# Patient Record
Sex: MALE | Race: WHITE | NOT HISPANIC OR LATINO | Employment: FULL TIME | URBAN - METROPOLITAN AREA
[De-identification: names, ages, dates, MRNs, and addresses within clinical notes are randomized per-mention and may not be internally consistent; named-entity substitution may affect disease eponyms.]

---

## 2018-05-18 DIAGNOSIS — E29.1 TESTOSTERONE DEFICIENCY IN MALE: Primary | ICD-10-CM

## 2018-05-18 PROBLEM — E55.9 VITAMIN D DEFICIENCY: Status: ACTIVE | Noted: 2018-05-18

## 2018-05-18 RX ORDER — TESTOSTERONE CYPIONATE 1000 MG/10ML
50 INJECTION, SOLUTION INTRAMUSCULAR WEEKLY
Qty: 10 ML | Refills: 0 | Status: SHIPPED | OUTPATIENT
Start: 2018-05-18 | End: 2018-06-15 | Stop reason: RX

## 2018-05-18 RX ORDER — TESTOSTERONE CYPIONATE 1000 MG/10ML
0.5 INJECTION, SOLUTION INTRAMUSCULAR WEEKLY
COMMUNITY
End: 2018-05-18 | Stop reason: SDUPTHER

## 2018-05-18 RX ORDER — CHOLECALCIFEROL (VITAMIN D3) 25 MCG
1 TABLET ORAL DAILY
COMMUNITY

## 2018-06-15 DIAGNOSIS — E29.1 TESTOSTERONE DEFICIENCY IN MALE: Primary | ICD-10-CM

## 2018-06-15 RX ORDER — TESTOSTERONE CYPIONATE 200 MG/ML
50 INJECTION, SOLUTION INTRAMUSCULAR
Qty: 10 ML | Refills: 0 | Status: SHIPPED | OUTPATIENT
Start: 2018-06-15 | End: 2018-10-30

## 2018-10-22 ENCOUNTER — TELEPHONE (OUTPATIENT)
Dept: INTERNAL MEDICINE | Facility: CLINIC | Age: 48
End: 2018-10-22

## 2018-10-22 ENCOUNTER — CLINICAL SUPPORT (OUTPATIENT)
Dept: INTERNAL MEDICINE | Facility: CLINIC | Age: 48
End: 2018-10-22
Payer: COMMERCIAL

## 2018-10-22 DIAGNOSIS — Z00.00 ROUTINE GENERAL MEDICAL EXAMINATION AT A HEALTH CARE FACILITY: Primary | ICD-10-CM

## 2018-10-22 PROCEDURE — 92551 PURE TONE HEARING TEST AIR: CPT | Mod: ,,, | Performed by: NURSE PRACTITIONER

## 2018-10-22 PROCEDURE — 90682 RIV4 VACC RECOMBINANT DNA IM: CPT | Mod: S$GLB,,, | Performed by: NURSE PRACTITIONER

## 2018-10-22 PROCEDURE — 90471 IMMUNIZATION ADMIN: CPT | Mod: S$GLB,,, | Performed by: NURSE PRACTITIONER

## 2018-10-23 ENCOUNTER — TELEPHONE (OUTPATIENT)
Dept: SURGERY | Facility: CLINIC | Age: 48
End: 2018-10-23

## 2018-10-23 NOTE — TELEPHONE ENCOUNTER
Phoned pt. Explained to pt that he would need to see his PCP for his yearly physical but he could be seen if he is due for his screening colonoscopy.  Pt states that he is leaving to go out of the country and will give the office a call back when he gets back.

## 2018-10-23 NOTE — TELEPHONE ENCOUNTER
----- Message from Sienna Link sent at 10/23/2018 10:40 AM CDT -----  Contact: Self  Patient states he comes to see Dr for his yearly physical and needs to get in next week   No appts available next week     Please call [phone] (home)

## 2018-10-30 ENCOUNTER — OFFICE VISIT (OUTPATIENT)
Dept: SURGERY | Facility: CLINIC | Age: 48
End: 2018-10-30
Payer: COMMERCIAL

## 2018-10-30 VITALS
HEIGHT: 70 IN | WEIGHT: 183.88 LBS | HEART RATE: 72 BPM | DIASTOLIC BLOOD PRESSURE: 78 MMHG | BODY MASS INDEX: 26.33 KG/M2 | RESPIRATION RATE: 18 BRPM | SYSTOLIC BLOOD PRESSURE: 124 MMHG | TEMPERATURE: 97 F | OXYGEN SATURATION: 97 %

## 2018-10-30 DIAGNOSIS — E29.1 TESTOSTERONE DEFICIENCY IN MALE: ICD-10-CM

## 2018-10-30 DIAGNOSIS — Z00.00 WELLNESS EXAMINATION: ICD-10-CM

## 2018-10-30 DIAGNOSIS — E55.9 VITAMIN D DEFICIENCY: ICD-10-CM

## 2018-10-30 DIAGNOSIS — Z12.11 ENCOUNTER FOR SCREENING COLONOSCOPY: Primary | ICD-10-CM

## 2018-10-30 PROCEDURE — 3008F BODY MASS INDEX DOCD: CPT | Mod: S$GLB,,, | Performed by: SURGERY

## 2018-10-30 PROCEDURE — 99203 OFFICE O/P NEW LOW 30 MIN: CPT | Mod: S$GLB,,, | Performed by: SURGERY

## 2018-10-30 RX ORDER — LIDOCAINE HYDROCHLORIDE 10 MG/ML
1 INJECTION, SOLUTION EPIDURAL; INFILTRATION; INTRACAUDAL; PERINEURAL ONCE
Status: CANCELLED | OUTPATIENT
Start: 2018-10-30 | End: 2018-10-30

## 2018-10-30 RX ORDER — TESTOSTERONE CYPIONATE 200 MG/ML
50 INJECTION, SOLUTION INTRAMUSCULAR
Qty: 10 ML | Refills: 0 | Status: SHIPPED | OUTPATIENT
Start: 2018-10-30 | End: 2019-04-08 | Stop reason: SDUPTHER

## 2018-10-30 RX ORDER — SODIUM CHLORIDE, SODIUM LACTATE, POTASSIUM CHLORIDE, CALCIUM CHLORIDE 600; 310; 30; 20 MG/100ML; MG/100ML; MG/100ML; MG/100ML
INJECTION, SOLUTION INTRAVENOUS CONTINUOUS
Status: CANCELLED | OUTPATIENT
Start: 2018-10-30

## 2018-10-30 RX ORDER — SODIUM, POTASSIUM,MAG SULFATES 17.5-3.13G
SOLUTION, RECONSTITUTED, ORAL ORAL
Qty: 2 BOTTLE | Refills: 0 | Status: ON HOLD | OUTPATIENT
Start: 2018-10-30 | End: 2018-12-03 | Stop reason: HOSPADM

## 2018-10-30 NOTE — PROGRESS NOTES
Subjective:       Patient ID: Gustabo Iqbal is a 48 y.o. male.    Chief Complaint: Follow-up      HPI:  Mr. Iqbal is seen today as a self-referral for colon cancer screening and wellness evaluation. He has no complaints. No abdominal pain, nausea, vomiting, diarrhea, constipation, melena, hematochezia, change in bowel habits, change in stool characteristics, weight loss, decrease in caliber of stool, etc. No family history of colon cancer. No aggravating or alleviating factors. No other associated symptoms. No prior colonoscopy.        Allergies & Meds:  Review of patient's allergies indicates:  No Known Allergies    Current Outpatient Medications   Medication Sig Dispense Refill    vitamin D 1000 units Tab Take 1 tablet by mouth once daily.      sodium,potassium,mag sulfates (SUPREP BOWEL PREP KIT) 17.5-3.13-1.6 gram SolR Follow written instructions provided by Clinic 2 Bottle 0    testosterone cypionate (DEPOTESTOTERONE CYPIONATE) 200 mg/mL injection Inject 0.25 mLs (50 mg total) into the muscle every 7 days. 10 mL 0     No current facility-administered medications for this visit.        PMFSHx:  Past Medical History:   Diagnosis Date    Annita-Parkinson-White syndrome        Past Surgical History:   Procedure Laterality Date    CARDIAC ELECTROPHYSIOLOGY STUDY AND ABLATION         Family History   Family history unknown: Yes       Social History     Tobacco Use    Smoking status: Never Smoker    Smokeless tobacco: Never Used   Substance Use Topics    Alcohol use: No     Frequency: Never    Drug use: No       Review of Systems   Constitutional: Negative for appetite change, chills, fatigue, fever and unexpected weight change.   HENT: Negative for congestion, dental problem, ear pain, mouth sores, postnasal drip, rhinorrhea, sore throat, tinnitus, trouble swallowing and voice change.    Eyes: Negative for photophobia, pain, discharge and visual disturbance.   Respiratory: Negative for cough, chest  tightness, shortness of breath and wheezing.    Cardiovascular: Negative for chest pain, palpitations and leg swelling.   Gastrointestinal: Negative for abdominal pain, blood in stool, constipation, diarrhea, nausea and vomiting.   Endocrine: Negative for cold intolerance, heat intolerance, polydipsia, polyphagia and polyuria.   Genitourinary: Negative for difficulty urinating, dysuria, flank pain, frequency, hematuria and urgency.   Musculoskeletal: Negative for arthralgias, joint swelling and myalgias.   Skin: Negative for color change and rash.   Allergic/Immunologic: Negative for immunocompromised state.   Neurological: Negative for dizziness, tremors, seizures, syncope, speech difficulty, weakness, numbness and headaches.   Hematological: Negative for adenopathy. Does not bruise/bleed easily.   Psychiatric/Behavioral: Negative for agitation, confusion, hallucinations, self-injury and suicidal ideas. The patient is not nervous/anxious.        Objective:      Physical Exam   Constitutional: He is oriented to person, place, and time. He appears well-developed and well-nourished. He is active.  Non-toxic appearance. He does not appear ill. No distress.   HENT:   Head: Normocephalic and atraumatic. Head is without contusion.   Right Ear: Hearing and external ear normal. No drainage or tenderness.   Left Ear: Hearing and external ear normal. No drainage or tenderness.   Nose: Nose normal. No rhinorrhea.   Eyes: Conjunctivae and lids are normal. Pupils are equal, round, and reactive to light. Right eye exhibits no discharge and no exudate. Left eye exhibits no discharge and no exudate. Right conjunctiva is not injected. Left conjunctiva is not injected.   Neck: Trachea normal, normal range of motion and phonation normal. No JVD present. Carotid bruit is not present. No tracheal deviation and no edema present. No thyroid mass and no thyromegaly present.   Cardiovascular: Normal rate, regular rhythm, S1 normal, S2  normal, normal heart sounds and intact distal pulses. PMI is not displaced. Exam reveals no gallop and no friction rub.   No murmur heard.  Pulmonary/Chest: Effort normal and breath sounds normal. No accessory muscle usage. No tachypnea. No respiratory distress. He exhibits no mass, no tenderness, no crepitus and no retraction. Right breast exhibits no inverted nipple, no mass, no nipple discharge and no skin change. Left breast exhibits no inverted nipple, no mass, no nipple discharge and no skin change. Breasts are symmetrical.   Abdominal: Soft. Normal appearance and bowel sounds are normal. He exhibits no distension, no fluid wave, no abdominal bruit and no mass. There is no hepatosplenomegaly. There is no tenderness. No hernia. Hernia confirmed negative in the ventral area, confirmed negative in the right inguinal area and confirmed negative in the left inguinal area.   Genitourinary: Testes normal and penis normal. Right testis shows no mass and no swelling. Left testis shows no mass and no swelling.   Musculoskeletal:        Cervical back: Normal.        Thoracic back: Normal.        Lumbar back: Normal.        Right upper arm: Normal.        Left upper arm: Normal.        Right forearm: Normal.        Left forearm: Normal.        Right hand: Normal.        Left hand: Normal.        Right upper leg: Normal.        Left upper leg: Normal.        Right lower leg: Normal.        Left lower leg: Normal.        Right foot: Normal.        Left foot: Normal.   Lymphadenopathy:        Head (right side): No submental and no submandibular adenopathy present.        Head (left side): No submental and no submandibular adenopathy present.     He has no cervical adenopathy.     He has no axillary adenopathy. No inguinal adenopathy noted on the right or left side.        Right: No inguinal and no supraclavicular adenopathy present.        Left: No inguinal and no supraclavicular adenopathy present.   Neurological: He is  alert and oriented to person, place, and time. He has normal strength. He is not disoriented. He displays no atrophy and no tremor. No cranial nerve deficit or sensory deficit. Coordination and gait normal. GCS eye subscore is 4. GCS verbal subscore is 5. GCS motor subscore is 6.   Skin: Skin is warm, dry and intact. No lesion and no rash noted. No cyanosis. Nails show no clubbing.   Psychiatric: He has a normal mood and affect. His speech is normal and behavior is normal. Thought content normal. He is not actively hallucinating. He is attentive.         Test Results:  Pending    Assessment:       1. Encounter for screening colonoscopy    2. Wellness examination    3. Testosterone deficiency in male    4. Vitamin D deficiency        Plan:   Encounter for screening colonoscopy  -     Case Request Operating Room: COLONOSCOPY  -     Full code; Standing  -     Place in Outpatient; Standing  -     Vital signs; Standing  -     Insert peripheral IV; Standing  -     Verify beta-blocker dose taken within 24 hours if patient is prescribed beta-blocker; Standing  -     Height and weight; Standing  -     Verify discontinuation of antithrombotics; Standing  -     Verify blood consent; Standing  -     Verify consent; Standing  -     Diet NPO; Standing  -     EKG 12-lead; Future; Expected date: 10/30/2018    Wellness examination  -     CBC auto differential; Future; Expected date: 10/30/2018  -     Comprehensive metabolic panel; Future; Expected date: 10/30/2018  -     TSH; Future; Expected date: 10/30/2018  -     Lipid panel; Future; Expected date: 10/30/2018  -     Hemoglobin A1c; Future; Expected date: 10/30/2018    Testosterone deficiency in male  -     Testosterone; Future; Expected date: 10/30/2018    Vitamin D deficiency  -     Vitamin D; Future; Expected date: 10/30/2018    Other orders  -     lidocaine (PF) 10 mg/ml (1%) injection 10 mg  -     lactated ringers infusion  -     IP VTE LOW RISK PATIENT; Standing  -      sodium,potassium,mag sulfates (SUPREP BOWEL PREP KIT) 17.5-3.13-1.6 gram SolR; Follow written instructions provided by Clinic  Dispense: 2 Bottle; Refill: 0  -     testosterone cypionate (DEPOTESTOTERONE CYPIONATE) 200 mg/mL injection; Inject 0.25 mLs (50 mg total) into the muscle every 7 days.  Dispense: 10 mL; Refill: 0        Follow-up in about 6 weeks (around 12/11/2018) for routine post-endosocpy visit.    Counseling/Medical Decision Making: Gustabo Iqbal was counseled regarding the results of the evaluation thus far and the differential diagnosis.  Diagnostic and therapeutic options were discussed in detail along with the risks, benefits, possible complications, details of, and indications for each option.  Diagnoses discussed included but were not limited to: hemorrhoids, diverticulosis, cancer, IBD, IBS, benign tumors, angiodysplasias.  Options discussed included but were not limited to: colonoscopy, proctoscopy, anoscopy, sigmoidoscopy, radiologic studies, PillCam, empiric therapy, second opinion, etc. Possible complications of colonoscopy discussed included but were not limited to: bleeding, infections, endocarditis, injury to internal organs, incomplete exam, failure to diagnose cancer or other serious condition, need for emergency surgery, need for a temporary colostomy, need for additional operations or procedures, etc.  Entire conversation was held in layman's terms.  Questions solicited and answered.  I read the consent form to him verbatim.  All unfamiliar terms were defined.  Krames booklets were provided on colonoscopy, polyps, diverticulosis, hemorrhoids, cancer, etc.  A copy of the consent form was provided as well for his review outside the office / hospital prior to the procedure.  At the conclusion of the conversation he voiced complete understanding of all we discussed and satisfaction that all of his questions had been answered to his full understanding.  He stated that he felt fully  informed and totally capable of making an informed decision.  He desires and requests to proceed with colonoscopy.

## 2018-11-05 ENCOUNTER — HOSPITAL ENCOUNTER (OUTPATIENT)
Dept: CARDIOLOGY | Facility: HOSPITAL | Age: 48
Discharge: HOME | End: 2018-11-05
Attending: SURGERY
Payer: COMMERCIAL

## 2018-11-05 DIAGNOSIS — Z12.11 ENCOUNTER FOR SCREENING COLONOSCOPY: ICD-10-CM

## 2018-11-05 DIAGNOSIS — Z12.11 ENCOUNTER FOR SCREENING COLONOSCOPY: Primary | ICD-10-CM

## 2018-11-05 PROCEDURE — 93005 ELECTROCARDIOGRAM TRACING: CPT

## 2018-11-11 NOTE — H&P
[facility] - General Surgery  General Surgery  H&P      Patient Name: Gustabo Iqbal  MRN: [id number]     Chief Complaint:  I am having a colonoscopy      HPI:  Mr. Iqbal is seen today as a self-referral for colon cancer screening and wellness evaluation. He has no complaints. No abdominal pain, nausea, vomiting, diarrhea, constipation, melena, hematochezia, change in bowel habits, change in stool characteristics, weight loss, decrease in caliber of stool, etc. No family history of colon cancer. No aggravating or alleviating factors. No other associated symptoms. No prior colonoscopy.        Allergies & Meds:  Review of patient's allergies indicates:  No Known Allergies    Current Outpatient Medications   Medication Sig Dispense Refill    vitamin D 1000 units Tab Take 1 tablet by mouth once daily.      sodium,potassium,mag sulfates (SUPREP BOWEL PREP KIT) 17.5-3.13-1.6 gram SolR Follow written instructions provided by Clinic 2 Bottle 0    testosterone cypionate (DEPOTESTOTERONE CYPIONATE) 200 mg/mL injection Inject 0.25 mLs (50 mg total) into the muscle every 7 days. 10 mL 0     No current facility-administered medications for this visit.        PMFSHx:  Past Medical History:   Diagnosis Date    Annita-Parkinson-White syndrome        Past Surgical History:   Procedure Laterality Date    CARDIAC ELECTROPHYSIOLOGY STUDY AND ABLATION         Family History   Family history unknown: Yes       Social History     Tobacco Use    Smoking status: Never Smoker    Smokeless tobacco: Never Used   Substance Use Topics    Alcohol use: No     Frequency: Never    Drug use: No       Review of Systems   Constitutional: Negative for appetite change, chills, fatigue, fever and unexpected weight change.   HENT: Negative for congestion, dental problem, ear pain, mouth sores, postnasal drip, rhinorrhea, sore throat, tinnitus, trouble swallowing and voice change.    Eyes: Negative for photophobia, pain,  discharge and visual disturbance.   Respiratory: Negative for cough, chest tightness, shortness of breath and wheezing.    Cardiovascular: Negative for chest pain, palpitations and leg swelling.   Gastrointestinal: Negative for abdominal pain, blood in stool, constipation, diarrhea, nausea and vomiting.   Endocrine: Negative for cold intolerance, heat intolerance, polydipsia, polyphagia and polyuria.   Genitourinary: Negative for difficulty urinating, dysuria, flank pain, frequency, hematuria and urgency.   Musculoskeletal: Negative for arthralgias, joint swelling and myalgias.   Skin: Negative for color change and rash.   Allergic/Immunologic: Negative for immunocompromised state.   Neurological: Negative for dizziness, tremors, seizures, syncope, speech difficulty, weakness, numbness and headaches.   Hematological: Negative for adenopathy. Does not bruise/bleed easily.   Psychiatric/Behavioral: Negative for agitation, confusion, hallucinations, self-injury and suicidal ideas. The patient is not nervous/anxious.        Objective:      Physical Exam   Constitutional: He is oriented to person, place, and time. He appears well-developed and well-nourished. He is active.  Non-toxic appearance. He does not appear ill. No distress.   HENT:   Head: Normocephalic and atraumatic. Head is without contusion.   Right Ear: Hearing and external ear normal. No drainage or tenderness.   Left Ear: Hearing and external ear normal. No drainage or tenderness.   Nose: Nose normal. No rhinorrhea.   Eyes: Conjunctivae and lids are normal. Pupils are equal, round, and reactive to light. Right eye exhibits no discharge and no exudate. Left eye exhibits no discharge and no exudate. Right conjunctiva is not injected. Left conjunctiva is not injected.   Neck: Trachea normal, normal range of motion and phonation normal. No JVD present. Carotid bruit is not present. No tracheal deviation and no edema present. No thyroid mass and no thyromegaly  present.   Cardiovascular: Normal rate, regular rhythm, S1 normal, S2 normal, normal heart sounds and intact distal pulses. PMI is not displaced. Exam reveals no gallop and no friction rub.   No murmur heard.  Pulmonary/Chest: Effort normal and breath sounds normal. No accessory muscle usage. No tachypnea. No respiratory distress. He exhibits no mass, no tenderness, no crepitus and no retraction. Right breast exhibits no inverted nipple, no mass, no nipple discharge and no skin change. Left breast exhibits no inverted nipple, no mass, no nipple discharge and no skin change. Breasts are symmetrical.   Abdominal: Soft. Normal appearance and bowel sounds are normal. He exhibits no distension, no fluid wave, no abdominal bruit and no mass. There is no hepatosplenomegaly. There is no tenderness. No hernia. Hernia confirmed negative in the ventral area, confirmed negative in the right inguinal area and confirmed negative in the left inguinal area.   Genitourinary: Testes normal and penis normal. Right testis shows no mass and no swelling. Left testis shows no mass and no swelling.   Musculoskeletal:        Cervical back: Normal.        Thoracic back: Normal.        Lumbar back: Normal.        Right upper arm: Normal.        Left upper arm: Normal.        Right forearm: Normal.        Left forearm: Normal.        Right hand: Normal.        Left hand: Normal.        Right upper leg: Normal.        Left upper leg: Normal.        Right lower leg: Normal.        Left lower leg: Normal.        Right foot: Normal.        Left foot: Normal.   Lymphadenopathy:        Head (right side): No submental and no submandibular adenopathy present.        Head (left side): No submental and no submandibular adenopathy present.     He has no cervical adenopathy.     He has no axillary adenopathy. No inguinal adenopathy noted on the right or left side.        Right: No inguinal and no supraclavicular adenopathy present.        Left: No  inguinal and no supraclavicular adenopathy present.   Neurological: He is alert and oriented to person, place, and time. He has normal strength. He is not disoriented. He displays no atrophy and no tremor. No cranial nerve deficit or sensory deficit. Coordination and gait normal. GCS eye subscore is 4. GCS verbal subscore is 5. GCS motor subscore is 6.   Skin: Skin is warm, dry and intact. No lesion and no rash noted. No cyanosis. Nails show no clubbing.   Psychiatric: He has a normal mood and affect. His speech is normal and behavior is normal. Thought content normal. He is not actively hallucinating. He is attentive.         Test Results:  Pending    Assessment:       1. Encounter for screening colonoscopy    2. Wellness examination    3. Testosterone deficiency in male    4. Vitamin D deficiency        Plan:   Encounter for screening colonoscopy  -     Case Request Operating Room: COLONOSCOPY  -     Full code; Standing  -     Place in Outpatient; Standing  -     Vital signs; Standing  -     Insert peripheral IV; Standing  -     Verify beta-blocker dose taken within 24 hours if patient is prescribed beta-blocker; Standing  -     Height and weight; Standing  -     Verify discontinuation of antithrombotics; Standing  -     Verify blood consent; Standing  -     Verify consent; Standing  -     Diet NPO; Standing  -     EKG 12-lead; Future; Expected date: 10/30/2018    Wellness examination  -     CBC auto differential; Future; Expected date: 10/30/2018  -     Comprehensive metabolic panel; Future; Expected date: 10/30/2018  -     TSH; Future; Expected date: 10/30/2018  -     Lipid panel; Future; Expected date: 10/30/2018  -     Hemoglobin A1c; Future; Expected date: 10/30/2018    Testosterone deficiency in male  -     Testosterone; Future; Expected date: 10/30/2018    Vitamin D deficiency  -     Vitamin D; Future; Expected date: 10/30/2018    Other orders  -     lidocaine (PF) 10 mg/ml (1%) injection 10 mg  -      lactated ringers infusion  -     IP VTE LOW RISK PATIENT; Standing  -     sodium,potassium,mag sulfates (SUPREP BOWEL PREP KIT) 17.5-3.13-1.6 gram SolR; Follow written instructions provided by Clinic  Dispense: 2 Bottle; Refill: 0  -     testosterone cypionate (DEPOTESTOTERONE CYPIONATE) 200 mg/mL injection; Inject 0.25 mLs (50 mg total) into the muscle every 7 days.  Dispense: 10 mL; Refill: 0        Follow-up in about 6 weeks (around 12/11/2018) for routine post-endosocpy visit.    Counseling/Medical Decision Making: Gustabo Iqbal was counseled regarding the results of the evaluation thus far and the differential diagnosis.  Diagnostic and therapeutic options were discussed in detail along with the risks, benefits, possible complications, details of, and indications for each option.  Diagnoses discussed included but were not limited to: hemorrhoids, diverticulosis, cancer, IBD, IBS, benign tumors, angiodysplasias.  Options discussed included but were not limited to: colonoscopy, proctoscopy, anoscopy, sigmoidoscopy, radiologic studies, PillCam, empiric therapy, second opinion, etc. Possible complications of colonoscopy discussed included but were not limited to: bleeding, infections, endocarditis, injury to internal organs, incomplete exam, failure to diagnose cancer or other serious condition, need for emergency surgery, need for a temporary colostomy, need for additional operations or procedures, etc.  Entire conversation was held in layman's terms.  Questions solicited and answered.  I read the consent form to him verbatim.  All unfamiliar terms were defined.  Krames booklets were provided on colonoscopy, polyps, diverticulosis, hemorrhoids, cancer, etc.  A copy of the consent form was provided as well for his review outside the office / hospital prior to the procedure.  At the conclusion of the conversation he voiced complete understanding of all we discussed and satisfaction that all of his questions had  been answered to his full understanding.  He stated that he felt fully informed and totally capable of making an informed decision.  He desires and requests to proceed with colonoscopy.

## 2018-12-03 ENCOUNTER — HOSPITAL ENCOUNTER (OUTPATIENT)
Facility: HOSPITAL | Age: 48
Discharge: HOME | End: 2018-12-03
Attending: SURGERY | Admitting: SURGERY
Payer: COMMERCIAL

## 2018-12-03 ENCOUNTER — ANESTHESIA (OUTPATIENT)
Dept: SURGERY | Facility: HOSPITAL | Age: 48
End: 2018-12-03
Payer: COMMERCIAL

## 2018-12-03 ENCOUNTER — ANESTHESIA EVENT (OUTPATIENT)
Dept: SURGERY | Facility: HOSPITAL | Age: 48
End: 2018-12-03
Payer: COMMERCIAL

## 2018-12-03 VITALS
WEIGHT: 185 LBS | HEART RATE: 59 BPM | DIASTOLIC BLOOD PRESSURE: 80 MMHG | HEIGHT: 70 IN | SYSTOLIC BLOOD PRESSURE: 121 MMHG | RESPIRATION RATE: 18 BRPM | TEMPERATURE: 98 F | OXYGEN SATURATION: 96 % | BODY MASS INDEX: 26.48 KG/M2

## 2018-12-03 DIAGNOSIS — Z12.11 ENCOUNTER FOR SCREENING COLONOSCOPY: ICD-10-CM

## 2018-12-03 PROCEDURE — 25000003 PHARM REV CODE 250: Performed by: SURGERY

## 2018-12-03 PROCEDURE — G0121 COLON CA SCRN NOT HI RSK IND: HCPCS | Mod: ,,, | Performed by: SURGERY

## 2018-12-03 PROCEDURE — D9220A PRA ANESTHESIA: Mod: ,,, | Performed by: ANESTHESIOLOGY

## 2018-12-03 PROCEDURE — 25000003 PHARM REV CODE 250

## 2018-12-03 PROCEDURE — 37000009 HC ANESTHESIA EA ADD 15 MINS: Performed by: SURGERY

## 2018-12-03 PROCEDURE — G0121 COLON CA SCRN NOT HI RSK IND: HCPCS | Performed by: SURGERY

## 2018-12-03 PROCEDURE — S0028 INJECTION, FAMOTIDINE, 20 MG: HCPCS

## 2018-12-03 PROCEDURE — 25000003 PHARM REV CODE 250: Performed by: NURSE ANESTHETIST, CERTIFIED REGISTERED

## 2018-12-03 PROCEDURE — 37000008 HC ANESTHESIA 1ST 15 MINUTES: Performed by: SURGERY

## 2018-12-03 PROCEDURE — 63600175 PHARM REV CODE 636 W HCPCS: Performed by: NURSE ANESTHETIST, CERTIFIED REGISTERED

## 2018-12-03 PROCEDURE — 45378 DIAGNOSTIC COLONOSCOPY: CPT | Performed by: SURGERY

## 2018-12-03 RX ORDER — SODIUM CHLORIDE, SODIUM LACTATE, POTASSIUM CHLORIDE, CALCIUM CHLORIDE 600; 310; 30; 20 MG/100ML; MG/100ML; MG/100ML; MG/100ML
INJECTION, SOLUTION INTRAVENOUS CONTINUOUS
Status: DISCONTINUED | OUTPATIENT
Start: 2018-12-03 | End: 2018-12-03 | Stop reason: HOSPADM

## 2018-12-03 RX ORDER — FAMOTIDINE 10 MG/ML
20 INJECTION INTRAVENOUS ONCE
Status: COMPLETED | OUTPATIENT
Start: 2018-12-03 | End: 2018-12-03

## 2018-12-03 RX ORDER — SODIUM CHLORIDE, SODIUM LACTATE, POTASSIUM CHLORIDE, CALCIUM CHLORIDE 600; 310; 30; 20 MG/100ML; MG/100ML; MG/100ML; MG/100ML
INJECTION, SOLUTION INTRAVENOUS CONTINUOUS PRN
Status: DISCONTINUED | OUTPATIENT
Start: 2018-12-03 | End: 2018-12-03

## 2018-12-03 RX ORDER — LIDOCAINE HYDROCHLORIDE 10 MG/ML
1 INJECTION, SOLUTION EPIDURAL; INFILTRATION; INTRACAUDAL; PERINEURAL ONCE
Status: DISCONTINUED | OUTPATIENT
Start: 2018-12-03 | End: 2018-12-03 | Stop reason: HOSPADM

## 2018-12-03 RX ORDER — FAMOTIDINE 10 MG/ML
INJECTION INTRAVENOUS
Status: COMPLETED
Start: 2018-12-03 | End: 2018-12-03

## 2018-12-03 RX ORDER — SODIUM CHLORIDE, SODIUM LACTATE, POTASSIUM CHLORIDE, CALCIUM CHLORIDE 600; 310; 30; 20 MG/100ML; MG/100ML; MG/100ML; MG/100ML
125 INJECTION, SOLUTION INTRAVENOUS CONTINUOUS
Status: DISCONTINUED | OUTPATIENT
Start: 2018-12-03 | End: 2018-12-03 | Stop reason: HOSPADM

## 2018-12-03 RX ORDER — LIDOCAINE HYDROCHLORIDE 20 MG/ML
INJECTION, SOLUTION EPIDURAL; INFILTRATION; INTRACAUDAL; PERINEURAL
Status: DISCONTINUED | OUTPATIENT
Start: 2018-12-03 | End: 2018-12-03

## 2018-12-03 RX ORDER — PROPOFOL 10 MG/ML
VIAL (ML) INTRAVENOUS
Status: DISCONTINUED | OUTPATIENT
Start: 2018-12-03 | End: 2018-12-03

## 2018-12-03 RX ORDER — ONDANSETRON 2 MG/ML
4 INJECTION INTRAMUSCULAR; INTRAVENOUS DAILY PRN
Status: DISCONTINUED | OUTPATIENT
Start: 2018-12-03 | End: 2018-12-03 | Stop reason: HOSPADM

## 2018-12-03 RX ADMIN — PROPOFOL 100 MG: 10 INJECTION, EMULSION INTRAVENOUS at 08:12

## 2018-12-03 RX ADMIN — FAMOTIDINE 20 MG: 10 INJECTION INTRAVENOUS at 07:12

## 2018-12-03 RX ADMIN — SODIUM CHLORIDE, POTASSIUM CHLORIDE, SODIUM LACTATE AND CALCIUM CHLORIDE: 600; 310; 30; 20 INJECTION, SOLUTION INTRAVENOUS at 08:12

## 2018-12-03 RX ADMIN — SODIUM CHLORIDE, POTASSIUM CHLORIDE, SODIUM LACTATE AND CALCIUM CHLORIDE: 600; 310; 30; 20 INJECTION, SOLUTION INTRAVENOUS at 07:12

## 2018-12-03 RX ADMIN — LIDOCAINE HYDROCHLORIDE 50 MG: 20 INJECTION, SOLUTION EPIDURAL; INFILTRATION; INTRACAUDAL; PERINEURAL at 08:12

## 2018-12-03 NOTE — DISCHARGE SUMMARY
[facility]  Discharge Note  Short Stay    Admit Date: 12/3/2018    Discharge Date and Time: No discharge date for patient encounter.     Attending Physician: Jimenez Rogers MD     Discharge Provider: Jimenez Rogers    Diagnoses:  Active Hospital Problems    Diagnosis  POA    *Encounter for screening colonoscopy [Z12.11]  Not Applicable      Resolved Hospital Problems   No resolved problems to display.       Discharged Condition: good    Hospital Course: Patient was admitted for an outpatient procedure and tolerated the procedure well with no complications.    Final Diagnoses: Same as principal problem.    Disposition: Home or Self Care    Follow up/Patient Instructions:    Medications:  Reconciled Home Medications:      Medication List      CONTINUE taking these medications    testosterone cypionate 200 mg/mL injection  Commonly known as:  DEPOTESTOTERONE CYPIONATE  Inject 0.25 mLs (50 mg total) into the muscle every 7 days.     vitamin D 1000 units Tab  Commonly known as:  VITAMIN D3  Take 1 tablet by mouth once daily.        STOP taking these medications    polyethylene glycol 240-22.72-6.72 -5.84 gram Solr  Commonly known as:  COLYTE     sodium,potassium,mag sulfates 17.5-3.13-1.6 gram Solr  Commonly known as:  SUPREP BOWEL PREP KIT          Discharge Procedure Orders   No driving until:     Order Specific Question Answer Comments   Date: 12/4/2018      Follow-up Information     Jimenez Rogers MD.    Specialty:  General Surgery  Why:  As needed  Contact information:  [address]  [facility] [address] [phone]                   Discharge Procedure Orders (must include Diet, Follow-up, Activity):   Discharge Procedure Orders (must include Diet, Follow-up, Activity)   No driving until:     Order Specific Question Answer Comments   Date: 12/4/2018

## 2018-12-03 NOTE — [employer] PATIENT INSTRUCTIONS
Discharge Summary/Instructions after an Endoscopic Procedure  Patient Name: Gustabo Iqbal  Patient MRN: [id number]  Patient YOB: 1970  Monday, December 03, 2018  Jimenez Rogers MD  RESTRICTIONS:  During your procedure today, you received medications for sedation.  These   medications may affect your judgment, balance and coordination.  Therefore,   for 24 hours, you have the following restrictions:   - DO NOT drive a car, operate machinery, make legal/financial decisions,   sign important papers or drink alcohol.    ACTIVITY:  Today: no heavy lifting, straining or running due to procedural   sedation/anesthesia.  The following day: return to full activity including work.  DIET:  Eat and drink normally unless instructed otherwise.     TREATMENT FOR COMMON SIDE EFFECTS:  - Mild abdominal pain, nausea, belching, bloating or excessive gas:  rest,   eat lightly and use a heating pad.  - Sore Throat: treat with throat lozenges and/or gargle with warm salt   water.  - Because air was used during the procedure, expelling large amounts of air   from your rectum or belching is normal.  - If a bowel prep was taken, you may not have a bowel movement for 1-3 days.    This is normal.  SYMPTOMS TO WATCH FOR AND REPORT TO YOUR PHYSICIAN:  1. Abdominal pain or bloating, other than gas cramps.  2. Chest pain.  3. Back pain.  4. Signs of infection such as: chills or fever occurring within 24 hours   after the procedure.  5. Rectal bleeding, which would show as bright red, maroon, or black stools.   (A tablespoon of blood from the rectum is not serious, especially if   hemorrhoids are present.)  6. Vomiting.  7. Weakness or dizziness.  GO DIRECTLY TO THE NEAREST EMERGENCY ROOM IF YOU HAVE ANY OF THE FOLLOWING:      Difficulty breathing              Chills and/or fever over 101 F   Persistent vomiting and/or vomiting blood   Severe abdominal pain   Severe chest pain   Black, tarry stools   Bleeding- more than one  tablespoon   Any other symptom or condition that you feel may need urgent attention  Your doctor recommends these additional instructions:  If any biopsies were taken, your doctors clinic will contact you in 1 to 2   weeks with any results.  - Discharge patient to home.   - Resume previous diet.   - Continue present medications.   - Repeat colonoscopy in 10 years for screening purposes.   - Return to my office in 2 weeks.  For questions, problems or results please call your physician - Jimenez Rogers MD at Work:  [phone].  [facility] EMERGENCY ROOM PHONE NUMBER: [phone]  IF A COMPLICATION OR EMERGENCY SITUATION ARISES AND YOU ARE UNABLE TO REACH   YOUR PHYSICIAN - GO DIRECTLY TO THE EMERGENCY ROOM.  MD Jimenez Wheeler MD  12/3/2018 10:53:46 AM  This report has been verified and signed electronically.  [employer]

## 2018-12-03 NOTE — ANESTHESIA PREPROCEDURE EVALUATION
12/03/2018  Gustabo Iqbal is a 48 y.o., male.    Anesthesia Evaluation    I have reviewed the Patient Summary Reports.    I have reviewed the Nursing Notes.   I have reviewed the Medications.     Review of Systems  Anesthesia Hx:  No problems with previous Anesthesia    Social:  Non-Smoker    Hematology/Oncology:  Hematology Normal   Oncology Normal     EENT/Dental:EENT/Dental Normal   Cardiovascular:   Dysrhythmias (WPW> ablation with resolution)    Pulmonary:  Pulmonary Normal    Renal/:  Renal/ Normal     Musculoskeletal:  Musculoskeletal Normal    Neurological:  Neurology Normal    Endocrine:  Endocrine Normal    Dermatological:  Skin Normal    Psych:  Psychiatric Normal           Physical Exam  General:  Well nourished    Airway/Jaw/Neck:  Airway Findings: Mouth Opening: Normal Tongue: Normal  Mallampati: II  TM Distance: Normal, at least 6 cm  Jaw/Neck Findings:  Neck ROM: Normal ROM      Dental:  Dental Findings: In tact   Chest/Lungs:  Chest/Lungs Findings: Clear to auscultation     Heart/Vascular:  Heart Findings: Rate: Normal  Rhythm: Regular Rhythm        Mental Status:  Mental Status Findings:  Cooperative         Anesthesia Plan  Type of Anesthesia, risks & benefits discussed:  Anesthesia Type:  general  Patient's Preference:   Intra-op Monitoring Plan: standard ASA monitors  Intra-op Monitoring Plan Comments:   Post Op Pain Control Plan: IV/PO Opioids PRN  Post Op Pain Control Plan Comments:   Induction:   IV  Beta Blocker:  Patient is not currently on a Beta-Blocker (No further documentation required).       Informed Consent: Patient understands risks and agrees with Anesthesia plan.  Questions answered. Anesthesia consent signed with patient.  ASA Score: 1     Day of Surgery Review of History & Physical: I have interviewed and examined the patient. I have reviewed the patient's H&P dated:             Ready For Surgery From Anesthesia Perspective.

## 2018-12-03 NOTE — DISCHARGE INSTRUCTIONS
Diverticulosis    Diverticulosis means that small pouches have formed in the wall of your large intestine (colon). Most often, this problem causes no symptoms and is common as people age. But the pouches in the colon are at risk of becoming infected. When this happens, the condition is called diverticulitis. Although most people with diverticulosis never develop diverticulitis, it is still not uncommon. Rectal bleeding can also occur and in less common situations, a type of colon inflammation called colitis.  While most people do not have symptoms, some people with diverticulosis may have:  · Abdominal cramps and pain  · Bloating  · Constipation  · Change in bowel habits  Causes  The exact cause of diverticulosis (and diverticulitis) has not been proved, but a few things are associated with the condition:  · Low-fiber diet  · Constipation  · Lack of exercise  Your healthcare provider will talk with you about how to manage your condition. Diet changes may be all that are needed to help control diverticulosis and prevent progression to diverticulitis. If you develop diverticulitis, you will likely need other treatments.  Home care  You may be told to take fiber supplements daily. Fiber adds bulk to the stool so that it passes through the colon more easily. Stool softeners may be recommended. You may also be given medications for pain relief. Be sure to take all medications as directed.  In the past, people were told to avoid corn, nuts, and seeds. This is no longer necessary.  Follow these guidelines when caring for yourself at home:  · Eat unprocessed foods that are high in fiber. Whole grains, fruits, and vegetables are good choices.  · Drink 6 to 8 glasses of water every day unless your healthcare provider has you limit how much fluid you should have.  · Watch for changes in your bowel movements. Tell your provider if you notice any changes.  · Begin an exercise program. Ask your provider how to get started.  Generally, walking is the best.  · Get plenty of rest and sleep.  Follow-up care  Follow up with your healthcare provider, or as advised. Regular visits may be needed to check on your health. Sometimes special procedures such as colonoscopy, are needed after an episode of diverticulitis or blooding. Be sure to keep all your appointments.  If a stool sample was taken, or cultures were done, you should be told if they are positive, or if your treatment needs to be changed. You can call as directed for the results.  If X-rays were done, a radiologist will look at them. You will be told if there is a change in your treatment.  If antibiotics were prescribed, be sure to finish them all.  When to seek medical advice  Call your healthcare provider right away if any of these occur:  · Fever of 100.4°F (38°C) or higher, or as directed by your healthcare provider  · Severe cramps in the lower left side of the abdomen or pain that is getting worse  · Tenderness in the lower left side of the abdomen or worsening pain throughout the abdomen  · Diarrhea or constipation that doesn't get better within 24 hours  · Nausea and vomiting  · Bleeding from the rectum  Call 911  Call emergency services if any of the following occur:  · Trouble breathing  · Confusion  · Very drowsy or trouble awakening  · Fainting or loss of consciousness  · Rapid heart rate  · Chest pain  Date Last Reviewed: 12/30/2015 © 2000-2017 [employer]. [address] [place] [address]. All rights reserved. This information is not intended as a substitute for professional medical care. Always follow your healthcare professional's instructions.        4 Steps for Eating Healthier  Changing the way you eat can improve your health. It can lower your cholesterol and blood pressure, and help you stay at a healthy weight. Your diet doesnt have to be bland and boring to be healthy. Just watch your calories and follow these steps:    1. Eat fewer  unhealthy fats  · Choose more fish and lean meats instead of fatty cuts of meat.  · Skip butter and lard, and use less margarine.  · Pass on foods that have palm, coconut, or hydrogenated oils.  · Eat fewer high-fat dairy foods like cheese, ice cream, and whole milk.  · Get a heart-healthy cookbook and try some low-fat recipes.  2. Go light on salt  · Keep the saltshaker off the table.  · Limit high-salt ingredients, such as soy sauce, bouillon, and garlic salt.  · Instead of adding salt when cooking, season your food with herbs and flavorings. Try lemon, garlic, and onion.  · Limit convenience foods, such as boxed or canned foods and restaurant food.  · Read food labels and choose lower-sodium options.  3. Limit sugar  · Pause before you add sugars to pancakes, cereal, coffee, or tea. This includes white and brown table sugar, syrup, honey, and molasses. Cut your usual amount by half.  · Use non-sugar sweeteners. Stevia, aspartame, and sucralose can satisfy a sweet tooth without adding calories.  · Swap out sugar-filled soda and other drinks. Buy sugar-free or low-calorie beverages. Remember water is always the best choice.  · Read labels and choose foods with less added sugar. Keep in mind that dairy foods and foods with fruit will have some natural sugar.  · Cut the sugar in recipes by 1/3 to 1/2. Boost the flavor with extracts like almond, vanilla, or orange. Or add spices such as cinnamon or nutmeg.  4. Eat more fiber  · Eat fresh fruits and vegetables every day.  · Boost your diet with whole grains. Go for oats, whole-grain rice, and bran.  · Add beans and lentils to your meals.  · Drink more water to match your fiber increase. This is to help prevent constipation.  Date Last Reviewed: 5/11/2015  © 5400-5625 [employer]. [address] [place] [address], [place] [address]. All rights reserved. This information is not intended as a substitute for professional medical care. Always follow your healthcare  professional's instructions.

## 2018-12-03 NOTE — ANESTHESIA POSTPROCEDURE EVALUATION
"Anesthesia Post Evaluation    Patient: Gustabo Iqbal    Procedure(s) Performed: Procedure(s) (LRB):  COLONOSCOPY (N/A)    Final Anesthesia Type: general  Patient location during evaluation: PACU  Patient participation: Yes- Able to Participate  Level of consciousness: awake and alert  Post-procedure vital signs: reviewed and stable  Pain management: adequate  Airway patency: patent  PONV status at discharge: No PONV  Anesthetic complications: no      Cardiovascular status: blood pressure returned to baseline  Respiratory status: unassisted  Hydration status: euvolemic  Follow-up not needed.        Visit Vitals  /80   Pulse (!) 59   Temp 36.7 °C (98 °F)   Resp 18   Ht 5' 10" (1.778 m)   Wt 83.9 kg (185 lb)   SpO2 96%   BMI 26.54 kg/m²       Pain/Farhat Score: Farhat Score: 9 (12/3/2018  9:00 AM)  Modified Farhat Score: 20 (12/3/2018  9:35 AM)        "

## 2018-12-03 NOTE — H&P
[facility] - General Surgery  General Surgery  H&P   12/3/2018     Patient Name: Gustabo Iqbal  MRN: [id number]      Chief Complaint:  I am having a colonoscopy        HPI:  Mr. Iqbal is seen today as a self-referral for colon cancer screening and wellness evaluation. He has no complaints. No abdominal pain, nausea, vomiting, diarrhea, constipation, melena, hematochezia, change in bowel habits, change in stool characteristics, weight loss, decrease in caliber of stool, etc. No family history of colon cancer. No aggravating or alleviating factors. No other associated symptoms. No prior colonoscopy.           Allergies & Meds:  Review of patient's allergies indicates:  No Known Allergies            Current Outpatient Medications   Medication Sig Dispense Refill    vitamin D 1000 units Tab Take 1 tablet by mouth once daily.        sodium,potassium,mag sulfates (SUPREP BOWEL PREP KIT) 17.5-3.13-1.6 gram SolR Follow written instructions provided by Clinic 2 Bottle 0    testosterone cypionate (DEPOTESTOTERONE CYPIONATE) 200 mg/mL injection Inject 0.25 mLs (50 mg total) into the muscle every 7 days. 10 mL 0      No current facility-administered medications for this visit.          PMFSHx:       Past Medical History:   Diagnosis Date    Annita-Parkinson-White syndrome                 Past Surgical History:   Procedure Laterality Date    CARDIAC ELECTROPHYSIOLOGY STUDY AND ABLATION             Family History   Family history unknown: Yes         Social History            Tobacco Use    Smoking status: Never Smoker    Smokeless tobacco: Never Used   Substance Use Topics    Alcohol use: No       Frequency: Never    Drug use: No         Review of Systems   Constitutional: Negative for appetite change, chills, fatigue, fever and unexpected weight change.   HENT: Negative for congestion, dental problem, ear pain, mouth sores, postnasal drip, rhinorrhea, sore throat, tinnitus, trouble swallowing and  voice change.    Eyes: Negative for photophobia, pain, discharge and visual disturbance.   Respiratory: Negative for cough, chest tightness, shortness of breath and wheezing.    Cardiovascular: Negative for chest pain, palpitations and leg swelling.   Gastrointestinal: Negative for abdominal pain, blood in stool, constipation, diarrhea, nausea and vomiting.   Endocrine: Negative for cold intolerance, heat intolerance, polydipsia, polyphagia and polyuria.   Genitourinary: Negative for difficulty urinating, dysuria, flank pain, frequency, hematuria and urgency.   Musculoskeletal: Negative for arthralgias, joint swelling and myalgias.   Skin: Negative for color change and rash.   Allergic/Immunologic: Negative for immunocompromised state.   Neurological: Negative for dizziness, tremors, seizures, syncope, speech difficulty, weakness, numbness and headaches.   Hematological: Negative for adenopathy. Does not bruise/bleed easily.   Psychiatric/Behavioral: Negative for agitation, confusion, hallucinations, self-injury and suicidal ideas. The patient is not nervous/anxious.        Objective:      Physical Exam   Constitutional: He is oriented to person, place, and time. He appears well-developed and well-nourished. He is active.  Non-toxic appearance. He does not appear ill. No distress.   HENT:   Head: Normocephalic and atraumatic. Head is without contusion.   Right Ear: Hearing and external ear normal. No drainage or tenderness.   Left Ear: Hearing and external ear normal. No drainage or tenderness.   Nose: Nose normal. No rhinorrhea.   Eyes: Conjunctivae and lids are normal. Pupils are equal, round, and reactive to light. Right eye exhibits no discharge and no exudate. Left eye exhibits no discharge and no exudate. Right conjunctiva is not injected. Left conjunctiva is not injected.   Neck: Trachea normal, normal range of motion and phonation normal. No JVD present. Carotid bruit is not present. No tracheal deviation  and no edema present. No thyroid mass and no thyromegaly present.   Cardiovascular: Normal rate, regular rhythm, S1 normal, S2 normal, normal heart sounds and intact distal pulses. PMI is not displaced. Exam reveals no gallop and no friction rub.   No murmur heard.  Pulmonary/Chest: Effort normal and breath sounds normal. No accessory muscle usage. No tachypnea. No respiratory distress. He exhibits no mass, no tenderness, no crepitus and no retraction. Right breast exhibits no inverted nipple, no mass, no nipple discharge and no skin change. Left breast exhibits no inverted nipple, no mass, no nipple discharge and no skin change. Breasts are symmetrical.   Abdominal: Soft. Normal appearance and bowel sounds are normal. He exhibits no distension, no fluid wave, no abdominal bruit and no mass. There is no hepatosplenomegaly. There is no tenderness. No hernia. Hernia confirmed negative in the ventral area, confirmed negative in the right inguinal area and confirmed negative in the left inguinal area.   Genitourinary: Testes normal and penis normal. Right testis shows no mass and no swelling. Left testis shows no mass and no swelling.   Musculoskeletal:        Cervical back: Normal.        Thoracic back: Normal.        Lumbar back: Normal.        Right upper arm: Normal.        Left upper arm: Normal.        Right forearm: Normal.        Left forearm: Normal.        Right hand: Normal.        Left hand: Normal.        Right upper leg: Normal.        Left upper leg: Normal.        Right lower leg: Normal.        Left lower leg: Normal.        Right foot: Normal.        Left foot: Normal.   Lymphadenopathy:        Head (right side): No submental and no submandibular adenopathy present.        Head (left side): No submental and no submandibular adenopathy present.     He has no cervical adenopathy.     He has no axillary adenopathy. No inguinal adenopathy noted on the right or left side.        Right: No inguinal and no  supraclavicular adenopathy present.        Left: No inguinal and no supraclavicular adenopathy present.   Neurological: He is alert and oriented to person, place, and time. He has normal strength. He is not disoriented. He displays no atrophy and no tremor. No cranial nerve deficit or sensory deficit. Coordination and gait normal. GCS eye subscore is 4. GCS verbal subscore is 5. GCS motor subscore is 6.   Skin: Skin is warm, dry and intact. No lesion and no rash noted. No cyanosis. Nails show no clubbing.   Psychiatric: He has a normal mood and affect. His speech is normal and behavior is normal. Thought content normal. He is not actively hallucinating. He is attentive.          Test Results:  Pending     Assessment:       1. Encounter for screening colonoscopy    2. Wellness examination    3. Testosterone deficiency in male    4. Vitamin D deficiency        Plan:   Encounter for screening colonoscopy  -     Case Request Operating Room: COLONOSCOPY  -     Full code; Standing  -     Place in Outpatient; Standing  -     Vital signs; Standing  -     Insert peripheral IV; Standing  -     Verify beta-blocker dose taken within 24 hours if patient is prescribed beta-blocker; Standing  -     Height and weight; Standing  -     Verify discontinuation of antithrombotics; Standing  -     Verify blood consent; Standing  -     Verify consent; Standing  -     Diet NPO; Standing  -     EKG 12-lead; Future; Expected date: 10/30/2018     Wellness examination  -     CBC auto differential; Future; Expected date: 10/30/2018  -     Comprehensive metabolic panel; Future; Expected date: 10/30/2018  -     TSH; Future; Expected date: 10/30/2018  -     Lipid panel; Future; Expected date: 10/30/2018  -     Hemoglobin A1c; Future; Expected date: 10/30/2018     Testosterone deficiency in male  -     Testosterone; Future; Expected date: 10/30/2018     Vitamin D deficiency  -     Vitamin D; Future; Expected date: 10/30/2018     Other orders  -      lidocaine (PF) 10 mg/ml (1%) injection 10 mg  -     lactated ringers infusion  -     IP VTE LOW RISK PATIENT; Standing  -     sodium,potassium,mag sulfates (SUPREP BOWEL PREP KIT) 17.5-3.13-1.6 gram SolR; Follow written instructions provided by Clinic  Dispense: 2 Bottle; Refill: 0  -     testosterone cypionate (DEPOTESTOTERONE CYPIONATE) 200 mg/mL injection; Inject 0.25 mLs (50 mg total) into the muscle every 7 days.  Dispense: 10 mL; Refill: 0           Follow-up in about 6 weeks (around 12/11/2018) for routine post-endosocpy visit.     Counseling/Medical Decision Making: Gustabo Iqbal was counseled regarding the results of the evaluation thus far and the differential diagnosis.  Diagnostic and therapeutic options were discussed in detail along with the risks, benefits, possible complications, details of, and indications for each option.  Diagnoses discussed included but were not limited to: hemorrhoids, diverticulosis, cancer, IBD, IBS, benign tumors, angiodysplasias.  Options discussed included but were not limited to: colonoscopy, proctoscopy, anoscopy, sigmoidoscopy, radiologic studies, PillCam, empiric therapy, second opinion, etc. Possible complications of colonoscopy discussed included but were not limited to: bleeding, infections, endocarditis, injury to internal organs, incomplete exam, failure to diagnose cancer or other serious condition, need for emergency surgery, need for a temporary colostomy, need for additional operations or procedures, etc.  Entire conversation was held in layman's terms.  Questions solicited and answered.  I read the consent form to him verbatim.  All unfamiliar terms were defined.  Krames booklets were provided on colonoscopy, polyps, diverticulosis, hemorrhoids, cancer, etc.  A copy of the consent form was provided as well for his review outside the office / hospital prior to the procedure.  At the conclusion of the conversation he voiced complete understanding of all we  discussed and satisfaction that all of his questions had been answered to his full understanding.  He stated that he felt fully informed and totally capable of making an informed decision.  He desires and requests to proceed with colonoscopy.

## 2018-12-03 NOTE — TRANSFER OF CARE
"Anesthesia Transfer of Care Note    Patient: Gustabo Iqbal    Procedure(s) Performed: Procedure(s) (LRB):  COLONOSCOPY (N/A)    Patient location: PACU    Anesthesia Type: general    Transport from OR: Transported from OR on room air with adequate spontaneous ventilation    Post pain: adequate analgesia    Post assessment: no apparent anesthetic complications and tolerated procedure well    Post vital signs: stable    Level of consciousness: sedated and responds to stimulation    Nausea/Vomiting: no nausea/vomiting    Complications: none    Transfer of care protocol was followed      Last vitals:   Visit Vitals  /85 (BP Location: Right arm, Patient Position: Lying)   Pulse 76   Temp 36.9 °C (98.5 °F) (Oral)   Resp 17   Ht 5' 10" (1.778 m)   Wt 83.9 kg (185 lb)   SpO2 100%   BMI 26.54 kg/m²     "

## 2019-02-12 ENCOUNTER — CLINICAL SUPPORT (OUTPATIENT)
Dept: INTERNAL MEDICINE | Facility: CLINIC | Age: 49
End: 2019-02-12

## 2019-02-12 VITALS — DIASTOLIC BLOOD PRESSURE: 76 MMHG | HEART RATE: 59 BPM | SYSTOLIC BLOOD PRESSURE: 130 MMHG

## 2019-02-12 DIAGNOSIS — Z02.89 ENCOUNTER FOR PHYSICAL EXAMINATION RELATED TO EMPLOYMENT: Primary | ICD-10-CM

## 2019-02-12 PROCEDURE — 94750 PR PULMONARY COMPLIANCE STUDY: ICD-10-PCS | Mod: ,,, | Performed by: NURSE PRACTITIONER

## 2019-02-12 PROCEDURE — 94750 PR PULMONARY COMPLIANCE STUDY: CPT | Mod: ,,, | Performed by: NURSE PRACTITIONER

## 2019-04-08 DIAGNOSIS — E29.1 TESTOSTERONE DEFICIENCY IN MALE: Primary | ICD-10-CM

## 2019-04-08 RX ORDER — TESTOSTERONE CYPIONATE 200 MG/ML
INJECTION, SOLUTION INTRAMUSCULAR
Refills: 0 | OUTPATIENT
Start: 2019-04-08

## 2019-04-08 RX ORDER — TESTOSTERONE CYPIONATE 1000 MG/10ML
INJECTION, SOLUTION INTRAMUSCULAR
Refills: 0 | OUTPATIENT
Start: 2019-04-08

## 2019-04-08 RX ORDER — TESTOSTERONE CYPIONATE 200 MG/ML
50 INJECTION, SOLUTION INTRAMUSCULAR
Qty: 10 ML | Refills: 0 | Status: SHIPPED | OUTPATIENT
Start: 2019-04-08 | End: 2019-10-15

## 2019-09-26 DIAGNOSIS — E29.1 TESTOSTERONE DEFICIENCY IN MALE: ICD-10-CM

## 2019-09-26 DIAGNOSIS — Z00.00 WELLNESS EXAMINATION: Primary | ICD-10-CM

## 2019-09-26 DIAGNOSIS — E55.9 VITAMIN D DEFICIENCY: ICD-10-CM

## 2019-10-02 ENCOUNTER — LAB VISIT (OUTPATIENT)
Dept: LAB | Facility: HOSPITAL | Age: 49
End: 2019-10-02
Attending: SURGERY
Payer: COMMERCIAL

## 2019-10-02 ENCOUNTER — HOSPITAL ENCOUNTER (OUTPATIENT)
Dept: CARDIOLOGY | Facility: HOSPITAL | Age: 49
Discharge: HOME | End: 2019-10-02
Attending: SURGERY
Payer: COMMERCIAL

## 2019-10-02 DIAGNOSIS — Z00.00 WELLNESS EXAMINATION: ICD-10-CM

## 2019-10-02 DIAGNOSIS — E29.1 TESTOSTERONE DEFICIENCY IN MALE: ICD-10-CM

## 2019-10-02 DIAGNOSIS — E55.9 VITAMIN D DEFICIENCY: ICD-10-CM

## 2019-10-02 LAB
25(OH)D3+25(OH)D2 SERPL-MCNC: 49 NG/ML (ref 30–96)
ALBUMIN SERPL BCP-MCNC: 4.3 G/DL (ref 3.5–5.2)
ALP SERPL-CCNC: 67 U/L (ref 55–135)
ALT SERPL W/O P-5'-P-CCNC: 20 U/L (ref 10–44)
ANION GAP SERPL CALC-SCNC: 8 MMOL/L (ref 8–16)
AST SERPL-CCNC: 24 U/L (ref 10–40)
BASOPHILS # BLD AUTO: 0.07 K/UL (ref 0–0.2)
BASOPHILS NFR BLD: 1 % (ref 0–1.9)
BILIRUB SERPL-MCNC: 0.7 MG/DL (ref 0.1–1)
BUN SERPL-MCNC: 18 MG/DL (ref 6–20)
CALCIUM SERPL-MCNC: 8.9 MG/DL (ref 8.7–10.5)
CHLORIDE SERPL-SCNC: 106 MMOL/L (ref 95–110)
CHOLEST SERPL-MCNC: 207 MG/DL (ref 120–199)
CHOLEST/HDLC SERPL: 2.8 {RATIO} (ref 2–5)
CO2 SERPL-SCNC: 25 MMOL/L (ref 23–29)
CREAT SERPL-MCNC: 0.9 MG/DL (ref 0.5–1.4)
DIFFERENTIAL METHOD: NORMAL
EOSINOPHIL # BLD AUTO: 0.1 K/UL (ref 0–0.5)
EOSINOPHIL NFR BLD: 1.6 % (ref 0–8)
ERYTHROCYTE [DISTWIDTH] IN BLOOD BY AUTOMATED COUNT: 12 % (ref 11.5–14.5)
EST. GFR  (AFRICAN AMERICAN): >60 ML/MIN/1.73 M^2
EST. GFR  (NON AFRICAN AMERICAN): >60 ML/MIN/1.73 M^2
ESTIMATED AVG GLUCOSE: 114 MG/DL (ref 68–131)
GLUCOSE SERPL-MCNC: 102 MG/DL (ref 70–110)
HBA1C MFR BLD HPLC: 5.6 % (ref 4.5–6.2)
HCT VFR BLD AUTO: 42.5 % (ref 40–54)
HDLC SERPL-MCNC: 74 MG/DL (ref 40–75)
HDLC SERPL: 35.7 % (ref 20–50)
HGB BLD-MCNC: 14.4 G/DL (ref 14–18)
IMM GRANULOCYTES # BLD AUTO: 0.02 K/UL (ref 0–0.04)
IMM GRANULOCYTES NFR BLD AUTO: 0.3 % (ref 0–0.5)
LDLC SERPL CALC-MCNC: 125.2 MG/DL (ref 63–159)
LYMPHOCYTES # BLD AUTO: 1.5 K/UL (ref 1–4.8)
LYMPHOCYTES NFR BLD: 21.1 % (ref 18–48)
MCH RBC QN AUTO: 30.8 PG (ref 27–31)
MCHC RBC AUTO-ENTMCNC: 33.9 G/DL (ref 32–36)
MCV RBC AUTO: 91 FL (ref 82–98)
MONOCYTES # BLD AUTO: 0.6 K/UL (ref 0.3–1)
MONOCYTES NFR BLD: 8.2 % (ref 4–15)
NEUTROPHILS # BLD AUTO: 4.7 K/UL (ref 1.8–7.7)
NEUTROPHILS NFR BLD: 67.8 % (ref 38–73)
NONHDLC SERPL-MCNC: 133 MG/DL
NRBC BLD-RTO: 0 /100 WBC
PLATELET # BLD AUTO: 289 K/UL (ref 150–350)
PMV BLD AUTO: 9.3 FL (ref 9.2–12.9)
POTASSIUM SERPL-SCNC: 4 MMOL/L (ref 3.5–5.1)
PROT SERPL-MCNC: 6.6 G/DL (ref 6–8.4)
RBC # BLD AUTO: 4.67 M/UL (ref 4.6–6.2)
SODIUM SERPL-SCNC: 139 MMOL/L (ref 136–145)
T3FREE SERPL-MCNC: 2.8 PG/ML (ref 2.3–4.2)
T4 FREE SERPL-MCNC: 0.81 NG/DL (ref 0.71–1.51)
TRIGL SERPL-MCNC: 39 MG/DL (ref 30–150)
TSH SERPL DL<=0.005 MIU/L-ACNC: 2.6 UIU/ML (ref 0.34–5.6)
WBC # BLD AUTO: 6.92 K/UL (ref 3.9–12.7)

## 2019-10-02 PROCEDURE — 84270 ASSAY OF SEX HORMONE GLOBUL: CPT

## 2019-10-02 PROCEDURE — 84439 ASSAY OF FREE THYROXINE: CPT

## 2019-10-02 PROCEDURE — 82306 VITAMIN D 25 HYDROXY: CPT

## 2019-10-02 PROCEDURE — 84481 FREE ASSAY (FT-3): CPT

## 2019-10-02 PROCEDURE — 83036 HEMOGLOBIN GLYCOSYLATED A1C: CPT

## 2019-10-02 PROCEDURE — 84443 ASSAY THYROID STIM HORMONE: CPT

## 2019-10-02 PROCEDURE — 80061 LIPID PANEL: CPT

## 2019-10-02 PROCEDURE — 85025 COMPLETE CBC W/AUTO DIFF WBC: CPT

## 2019-10-02 PROCEDURE — 93005 ELECTROCARDIOGRAM TRACING: CPT

## 2019-10-02 PROCEDURE — 80053 COMPREHEN METABOLIC PANEL: CPT

## 2019-10-02 PROCEDURE — 36415 COLL VENOUS BLD VENIPUNCTURE: CPT

## 2019-10-02 PROCEDURE — 82040 ASSAY OF SERUM ALBUMIN: CPT

## 2019-10-08 LAB
ALBUMIN SERPL-MCNC: 4.6 G/DL (ref 3.6–5.1)
SHBG SERPL-SCNC: 39 NMOL/L (ref 10–50)
TESTOST FREE SERPL-MCNC: 29.3 PG/ML (ref 46–224)
TESTOST SERPL-MCNC: 269 NG/DL (ref 250–1100)
TESTOSTERONE.FREE+WB SERPL-MCNC: 61.6 NG/DL (ref 110–575)

## 2019-10-15 ENCOUNTER — IMMUNIZATION (OUTPATIENT)
Dept: FAMILY MEDICINE | Facility: CLINIC | Age: 49
End: 2019-10-15
Attending: SURGERY
Payer: COMMERCIAL

## 2019-10-15 ENCOUNTER — LAB VISIT (OUTPATIENT)
Dept: LAB | Facility: HOSPITAL | Age: 49
End: 2019-10-15
Attending: SURGERY
Payer: COMMERCIAL

## 2019-10-15 ENCOUNTER — OFFICE VISIT (OUTPATIENT)
Dept: SURGERY | Facility: CLINIC | Age: 49
End: 2019-10-15
Payer: COMMERCIAL

## 2019-10-15 VITALS
BODY MASS INDEX: 26.67 KG/M2 | HEIGHT: 70 IN | TEMPERATURE: 99 F | WEIGHT: 186.31 LBS | HEART RATE: 64 BPM | SYSTOLIC BLOOD PRESSURE: 118 MMHG | DIASTOLIC BLOOD PRESSURE: 81 MMHG | OXYGEN SATURATION: 96 % | RESPIRATION RATE: 11 BRPM

## 2019-10-15 DIAGNOSIS — E29.1 TESTOSTERONE DEFICIENCY IN MALE: ICD-10-CM

## 2019-10-15 DIAGNOSIS — Z23 FLU VACCINE NEED: Primary | ICD-10-CM

## 2019-10-15 DIAGNOSIS — Z00.00 WELLNESS EXAMINATION: ICD-10-CM

## 2019-10-15 DIAGNOSIS — B00.9 HSV-1 INFECTION: ICD-10-CM

## 2019-10-15 DIAGNOSIS — Z00.00 WELLNESS EXAMINATION: Primary | ICD-10-CM

## 2019-10-15 LAB — COMPLEXED PSA SERPL-MCNC: 0.5 NG/ML (ref 0–4)

## 2019-10-15 PROCEDURE — 36415 COLL VENOUS BLD VENIPUNCTURE: CPT

## 2019-10-15 PROCEDURE — 90471 IMMUNIZATION ADMIN: CPT | Mod: S$GLB,,, | Performed by: SURGERY

## 2019-10-15 PROCEDURE — 90686 FLU VACCINE (QUAD) GREATER THAN OR EQUAL TO 3YO PRESERVATIVE FREE IM: ICD-10-PCS | Mod: S$GLB,,, | Performed by: SURGERY

## 2019-10-15 PROCEDURE — 84153 ASSAY OF PSA TOTAL: CPT

## 2019-10-15 PROCEDURE — 3008F PR BODY MASS INDEX (BMI) DOCUMENTED: ICD-10-PCS | Mod: S$GLB,,, | Performed by: SURGERY

## 2019-10-15 PROCEDURE — 3008F BODY MASS INDEX DOCD: CPT | Mod: S$GLB,,, | Performed by: SURGERY

## 2019-10-15 PROCEDURE — 90471 FLU VACCINE (QUAD) GREATER THAN OR EQUAL TO 3YO PRESERVATIVE FREE IM: ICD-10-PCS | Mod: S$GLB,,, | Performed by: SURGERY

## 2019-10-15 PROCEDURE — 99213 OFFICE O/P EST LOW 20 MIN: CPT | Mod: 25,S$GLB,, | Performed by: SURGERY

## 2019-10-15 PROCEDURE — 99213 PR OFFICE/OUTPT VISIT, EST, LEVL III, 20-29 MIN: ICD-10-PCS | Mod: 25,S$GLB,, | Performed by: SURGERY

## 2019-10-15 PROCEDURE — 90686 IIV4 VACC NO PRSV 0.5 ML IM: CPT | Mod: S$GLB,,, | Performed by: SURGERY

## 2019-10-15 RX ORDER — TESTOSTERONE CYPIONATE 200 MG/ML
100 INJECTION, SOLUTION INTRAMUSCULAR
Qty: 13 ML | Refills: 0 | Status: SHIPPED | OUTPATIENT
Start: 2019-10-15 | End: 2020-04-14

## 2019-10-15 RX ORDER — VALACYCLOVIR HYDROCHLORIDE 500 MG/1
TABLET, FILM COATED ORAL
Refills: 2 | COMMUNITY
Start: 2019-08-28 | End: 2019-10-15 | Stop reason: SDUPTHER

## 2019-10-15 RX ORDER — VALACYCLOVIR HYDROCHLORIDE 500 MG/1
1000 TABLET, FILM COATED ORAL 2 TIMES DAILY
Qty: 16 TABLET | Refills: 1 | Status: SHIPPED | OUTPATIENT
Start: 2019-10-15 | End: 2020-04-21 | Stop reason: SDUPTHER

## 2019-10-15 NOTE — PROGRESS NOTES
Subjective:       Patient ID: Gustabo Iqbal is a 49 y.o. male.    Chief Complaint: Follow-up      HPI:  Mr. Iqbal presents today with no complaints.  He needs an annual physical exam.  He remains healthy and active.  He has no complaints whatsoever except mild fatigue that has improved but not completely resolved since institution of testosterone replacement therapy.  He continues to follow a healthy lifestyle.  Low fat low carbohydrate diet.  No smoking.  No alcohol consumption.  Strong family history of coronary artery disease, his father had a coronary artery bypass grafting in his 40s and  of a myocardial infarction in his 50s.  His father was a heavy tobacco abuser.  Screening colonoscopy last year was unremarkable.  He has known testosterone deficiency.  He has vitamin-D deficiency as well. Occasional outbreaks of HSV 1 labialis.      Allergies & Meds:  Review of patient's allergies indicates:  No Known Allergies    Current Outpatient Medications   Medication Sig Dispense Refill    valACYclovir (VALTREX) 500 MG tablet Take 2 tablets (1,000 mg total) by mouth 2 (two) times daily. for 8 days 16 tablet 1    vitamin D 1000 units Tab Take 1 tablet by mouth once daily.      testosterone cypionate (DEPOTESTOTERONE CYPIONATE) 200 mg/mL injection Inject 0.5 mLs (100 mg total) into the muscle every 7 days. 13 mL 0     No current facility-administered medications for this visit.        PMFSHx:  Past Medical History:   Diagnosis Date    Annita-Parkinson-White syndrome        Past Surgical History:   Procedure Laterality Date    CARDIAC ELECTROPHYSIOLOGY STUDY AND ABLATION      COLONOSCOPY N/A 12/3/2018    Procedure: COLONOSCOPY;  Surgeon: Jimenez Rogers MD;  Location: [facility];  Service: General;  Laterality: N/A;       Family History   Family history unknown: Yes       Social History     Tobacco Use    Smoking status: Never Smoker    Smokeless tobacco: Never Used   Substance Use Topics    Alcohol use: No      Frequency: Never    Drug use: No       Review of Systems   Constitutional: Negative for appetite change, chills, fatigue, fever and unexpected weight change.   HENT: Negative for congestion, dental problem, ear pain, mouth sores, postnasal drip, rhinorrhea, sore throat, tinnitus, trouble swallowing and voice change.    Eyes: Negative for photophobia, pain, discharge and visual disturbance.   Respiratory: Negative for cough, chest tightness, shortness of breath and wheezing.    Cardiovascular: Negative for chest pain, palpitations and leg swelling.   Gastrointestinal: Negative for abdominal pain, blood in stool, constipation, diarrhea, nausea and vomiting.   Endocrine: Negative for cold intolerance, heat intolerance, polydipsia, polyphagia and polyuria.   Genitourinary: Negative for difficulty urinating, dysuria, flank pain, frequency, hematuria and urgency.   Musculoskeletal: Negative for arthralgias, joint swelling and myalgias.   Skin: Negative for color change and rash.   Allergic/Immunologic: Negative for immunocompromised state.   Neurological: Negative for dizziness, tremors, seizures, syncope, speech difficulty, weakness, numbness and headaches.   Hematological: Negative for adenopathy. Does not bruise/bleed easily.   Psychiatric/Behavioral: Negative for agitation, confusion, hallucinations, self-injury and suicidal ideas. The patient is not nervous/anxious.        Objective:      Physical Exam   Constitutional: He is oriented to person, place, and time. He appears well-developed and well-nourished. He is active.  Non-toxic appearance. He does not appear ill. No distress.   Body mass index is 26.73 kg/m².     HENT:   Head: Normocephalic and atraumatic. Head is without contusion.   Right Ear: Hearing and external ear normal. No drainage or tenderness.   Left Ear: Hearing and external ear normal. No drainage or tenderness.   Nose: Nose normal. No rhinorrhea.   Eyes: Pupils are equal, round, and reactive to  light. Conjunctivae and lids are normal. Right eye exhibits no discharge and no exudate. Left eye exhibits no discharge and no exudate. Right conjunctiva is not injected. Left conjunctiva is not injected.   Neck: Trachea normal, normal range of motion and phonation normal. No JVD present. Carotid bruit is not present. No tracheal deviation and no edema present. No thyroid mass and no thyromegaly present.   Cardiovascular: Normal rate, regular rhythm, S1 normal, S2 normal, normal heart sounds and intact distal pulses. PMI is not displaced. Exam reveals no gallop and no friction rub.   No murmur heard.  Pulmonary/Chest: Effort normal and breath sounds normal. No accessory muscle usage. No tachypnea. No respiratory distress. He exhibits no mass, no tenderness, no crepitus and no retraction. Right breast exhibits no inverted nipple, no mass, no nipple discharge and no skin change. Left breast exhibits no inverted nipple, no mass, no nipple discharge and no skin change. Breasts are symmetrical.   Abdominal: Soft. Normal appearance and bowel sounds are normal. He exhibits no distension, no fluid wave, no abdominal bruit and no mass. There is no hepatosplenomegaly. There is no tenderness. No hernia. Hernia confirmed negative in the ventral area, confirmed negative in the right inguinal area and confirmed negative in the left inguinal area.   Genitourinary: Testes normal and penis normal. Right testis shows no mass and no swelling. Left testis shows no mass and no swelling.   Musculoskeletal:        Cervical back: Normal.        Thoracic back: Normal.        Lumbar back: Normal.        Right upper arm: Normal.        Left upper arm: Normal.        Right forearm: Normal.        Left forearm: Normal.        Right hand: Normal.        Left hand: Normal.        Right upper leg: Normal.        Left upper leg: Normal.        Right lower leg: Normal.        Left lower leg: Normal.        Right foot: Normal.        Left foot:  Normal.   Lymphadenopathy:        Head (right side): No submental and no submandibular adenopathy present.        Head (left side): No submental and no submandibular adenopathy present.     He has no cervical adenopathy.     He has no axillary adenopathy. No inguinal adenopathy noted on the right or left side.        Right: No inguinal and no supraclavicular adenopathy present.        Left: No inguinal and no supraclavicular adenopathy present.   Neurological: He is alert and oriented to person, place, and time. He has normal strength. He is not disoriented. He displays no atrophy and no tremor. No cranial nerve deficit or sensory deficit. Coordination and gait normal. GCS eye subscore is 4. GCS verbal subscore is 5. GCS motor subscore is 6.   Skin: Skin is warm, dry and intact. No lesion and no rash noted. No cyanosis. Nails show no clubbing.   Psychiatric: He has a normal mood and affect. His speech is normal and behavior is normal. Thought content normal. He is not actively hallucinating. He is attentive.         Test Results:  EKG reviewed from 10/2/2019.  Twelve lead tracing showed a normal sinus rhythm with no evidence of any ischemic changes.  EKG was unchanged from the tracing dated 11/5/2018.    Lab results reviewed from 10/2/2019.  CBC shows no evidence of leukocytosis, anemia, or thrombocytopenia.  Electrolytes are all in the range of normal.  BUN and creatinine showed no evidence of renal dysfunction.  Glucose showed no suspicion diabetes.  Liver profile showed no evidence of hepatocellular disease or biliary obstruction. TSH, free T3, and free T4 all indicate he is euthyroid.  Hemoglobin A1c shows no evidence of diabetes.  Vitamin D level is at the lower end of normal but without evidence of deficiency.  Testosterone panel shows persistent testosterone deficiency despite current replacement therapy.  This panel was drawn in the morning as instructed 1 day following his weekly therapeutic injection.   Lipid profile shows a mild hypercholesterolemia with an elevated HDL and a low risk HDL/cholesterol ratio.  Lipid profile shows no evidence of hypertriglyceridemia.      Assessment:       Persistent fatigue.  Persistent testosterone deficiency.  Increase in testosterone therapy warranted.  Increase in over-the-counter vitamin-D therapy may also proved beneficial.  Due for PSA screening.  1. Wellness examination    2. Testosterone deficiency in male    3. HSV-1 infection        Plan:   Wellness examination  -     PSA, Screening; Future; Expected date: 10/15/2019    Testosterone deficiency in male  -     Testosterone Panel; Future; Expected date: 04/01/2020    HSV-1 infection    Other orders  -     testosterone cypionate (DEPOTESTOTERONE CYPIONATE) 200 mg/mL injection; Inject 0.5 mLs (100 mg total) into the muscle every 7 days.  Dispense: 13 mL; Refill: 0  -     valACYclovir (VALTREX) 500 MG tablet; Take 2 tablets (1,000 mg total) by mouth 2 (two) times daily. for 8 days  Dispense: 16 tablet; Refill: 1     PSA level today, follow-up by portal next week.  Repeat thyroid deficiency panel in early April of 2020, follow-up mid April by phone or portal.    Follow up in about 1 year (around 10/15/2020).    Counseling/Medical Decision Making:  Risks and benefits of testosterone replacement therapy were discussed.  Risks discussed included were not limited to prostate cancer, vascular disease, heart disease, etc.  Questions solicited and answered.  He desires to proceed with increased therapeutic replacement.    Offer for cardiology consultation in light of family history was extended but declined.    Total face to face encounter time was 40 minutes, 30 minutes spent counseling as outlined/summarized above.

## 2019-11-20 RX ORDER — TESTOSTERONE CYPIONATE 200 MG/ML
100 INJECTION, SOLUTION INTRAMUSCULAR
Qty: 6 ML | Refills: 3 | Status: SHIPPED | OUTPATIENT
Start: 2019-11-20 | End: 2020-07-31 | Stop reason: SDUPTHER

## 2020-01-10 DIAGNOSIS — E29.1 TESTOSTERONE DEFICIENCY IN MALE: Primary | ICD-10-CM

## 2020-01-10 RX ORDER — ANASTROZOLE 1 MG/1
TABLET ORAL
Qty: 12 TABLET | Refills: 3 | Status: SHIPPED | OUTPATIENT
Start: 2020-01-10 | End: 2020-10-14 | Stop reason: SDUPTHER

## 2020-01-10 NOTE — PROGRESS NOTES
Treated for testosterone deficiency.  Following last blood tests testosterone dose increased slightly in light of ongoing symptomatic deficiency.  Now experiencing breast tenderness and persistent symptoms of testosterone deficiency.  Trial of Arimidex warranted both alleviate symptoms of breast tenderness precipitated by therapy and also to treat deficiency in an effort to avoid further dose increase.

## 2020-03-09 ENCOUNTER — CLINICAL SUPPORT (OUTPATIENT)
Dept: INTERNAL MEDICINE | Facility: CLINIC | Age: 50
End: 2020-03-09

## 2020-03-09 VITALS — SYSTOLIC BLOOD PRESSURE: 139 MMHG | DIASTOLIC BLOOD PRESSURE: 89 MMHG

## 2020-03-09 DIAGNOSIS — Z00.00 ROUTINE GENERAL MEDICAL EXAMINATION AT A HEALTH CARE FACILITY: Primary | ICD-10-CM

## 2020-03-09 PROCEDURE — 92551 PURE TONE HEARING TEST AIR: CPT | Mod: ,,, | Performed by: NURSE PRACTITIONER

## 2020-03-09 PROCEDURE — 92551 PR PURE TONE HEARING TEST, AIR: ICD-10-PCS | Mod: ,,, | Performed by: NURSE PRACTITIONER

## 2020-04-21 DIAGNOSIS — B00.9 HSV-1 INFECTION: Primary | ICD-10-CM

## 2020-04-21 RX ORDER — VALACYCLOVIR HYDROCHLORIDE 500 MG/1
1000 TABLET, FILM COATED ORAL 2 TIMES DAILY
Qty: 16 TABLET | Refills: 11 | Status: SHIPPED | OUTPATIENT
Start: 2020-04-21 | End: 2020-10-14 | Stop reason: SDUPTHER

## 2020-04-21 NOTE — PROGRESS NOTES
Ran out of acyclovir after last course therapy for acute fever blister.  Requesting refill prescription to keep on hand until next outbreak.

## 2020-04-29 ENCOUNTER — OFFICE VISIT (OUTPATIENT)
Dept: PODIATRY | Facility: CLINIC | Age: 50
End: 2020-04-29
Payer: COMMERCIAL

## 2020-04-29 VITALS
BODY MASS INDEX: 27.92 KG/M2 | SYSTOLIC BLOOD PRESSURE: 144 MMHG | TEMPERATURE: 98 F | WEIGHT: 195 LBS | HEIGHT: 70 IN | HEART RATE: 65 BPM | DIASTOLIC BLOOD PRESSURE: 92 MMHG

## 2020-04-29 DIAGNOSIS — M72.2 PLANTAR FASCIITIS: ICD-10-CM

## 2020-04-29 DIAGNOSIS — D36.10 NEUROMA: Primary | ICD-10-CM

## 2020-04-29 PROCEDURE — 99999 PR PBB SHADOW E&M-EST. PATIENT-LVL III: CPT | Mod: PBBFAC,,, | Performed by: PODIATRIST

## 2020-04-29 PROCEDURE — 99202 PR OFFICE/OUTPT VISIT, NEW, LEVL II, 15-29 MIN: ICD-10-PCS | Mod: S$GLB,,, | Performed by: PODIATRIST

## 2020-04-29 PROCEDURE — 3008F PR BODY MASS INDEX (BMI) DOCUMENTED: ICD-10-PCS | Mod: S$GLB,,, | Performed by: PODIATRIST

## 2020-04-29 PROCEDURE — 99999 PR PBB SHADOW E&M-EST. PATIENT-LVL III: ICD-10-PCS | Mod: PBBFAC,,, | Performed by: PODIATRIST

## 2020-04-29 PROCEDURE — 3008F BODY MASS INDEX DOCD: CPT | Mod: S$GLB,,, | Performed by: PODIATRIST

## 2020-04-29 PROCEDURE — 99202 OFFICE O/P NEW SF 15 MIN: CPT | Mod: S$GLB,,, | Performed by: PODIATRIST

## 2020-05-03 PROBLEM — D36.10 NEUROMA: Status: ACTIVE | Noted: 2020-05-03

## 2020-05-03 PROBLEM — M72.2 PLANTAR FASCIITIS: Status: ACTIVE | Noted: 2020-05-03

## 2020-05-03 NOTE — PROGRESS NOTES
Subjective:       Patient ID: Gustabo Iqbal is a 49 y.o. male.    Chief Complaint: Foot Pain and Foot Problem    Patient presents today with complaint of bilateral foot pain left greater than right patient states because of the corona virus pandemic he has had to change his exercise routine rather than running on a treadmill he has been doing more exercise outside running on hard ground he does have a history of plantar fasciitis currently wears power steps but is now complaining of pain in the forefoot bilateral left greater than right he states it feels like he has a hot poker in his foot.  Past Medical History:   Diagnosis Date    Annita-Parkinson-White syndrome      Past Surgical History:   Procedure Laterality Date    cardiac ablasion      CARDIAC ELECTROPHYSIOLOGY STUDY AND ABLATION      COLONOSCOPY N/A 12/3/2018    Procedure: COLONOSCOPY;  Surgeon: Jimenez Rogers MD;  Location: [facility];  Service: General;  Laterality: N/A;     Family History   Problem Relation Age of Onset    Heart disease Father      Social History     Socioeconomic History    Marital status:      Spouse name: Not on file    Number of children: Not on file    Years of education: Not on file    Highest education level: Not on file   Occupational History    Not on file   Social Needs    Financial resource strain: Not on file    Food insecurity:     Worry: Not on file     Inability: Not on file    Transportation needs:     Medical: Not on file     Non-medical: Not on file   Tobacco Use    Smoking status: Never Smoker    Smokeless tobacco: Never Used   Substance and Sexual Activity    Alcohol use: No     Frequency: Never    Drug use: No    Sexual activity: Yes     Partners: Female   Lifestyle    Physical activity:     Days per week: Not on file     Minutes per session: Not on file    Stress: Not on file   Relationships    Social connections:     Talks on phone: Not on file     Gets together: Not on file      "Attends Christianity service: Not on file     Active member of club or organization: Not on file     Attends meetings of clubs or organizations: Not on file     Relationship status: Not on file   Other Topics Concern    Not on file   Social History Narrative    Not on file       Current Outpatient Medications   Medication Sig Dispense Refill    anastrozole (ARIMIDEX) 1 mg Tab Take 1/2 tab by mouth every Monday and Thursday. 12 tablet 3    testosterone cypionate (DEPOTESTOTERONE CYPIONATE) 200 mg/mL injection Inject 0.5 mLs (100 mg total) into the muscle every 7 days. 6 mL 3    valACYclovir (VALTREX) 500 MG tablet Take 2 tablets (1,000 mg total) by mouth 2 (two) times daily. 16 tablet 11    vitamin D 1000 units Tab Take 1 tablet by mouth once daily.       No current facility-administered medications for this visit.      Review of patient's allergies indicates:  No Known Allergies    Review of Systems   Musculoskeletal: Positive for arthralgias.   All other systems reviewed and are negative.      Objective:      Vitals:    04/29/20 1454   BP: (!) 144/92   Pulse: 65   Temp: 97.9 °F (36.6 °C)   Weight: 88.5 kg (195 lb)   Height: 5' 10" (1.778 m)     Physical Exam   Constitutional: He appears well-developed and well-nourished.   Cardiovascular:   Pulses:       Dorsalis pedis pulses are 2+ on the right side, and 2+ on the left side.        Posterior tibial pulses are 2+ on the right side, and 2+ on the left side.   Pulmonary/Chest: Effort normal.   Musculoskeletal: Normal range of motion. He exhibits tenderness.        Feet:    Feet:   Right Foot:   Protective Sensation: 2 sites tested. 2 sites sensed.   Left Foot:   Protective Sensation: 2 sites tested. 2 sites sensed.   Skin Integrity: Positive for erythema and warmth.   Neurological: He is alert.   Skin: Skin is warm. Capillary refill takes less than 2 seconds.   Psychiatric: He has a normal mood and affect. His behavior is normal. Judgment and thought content " normal.   Nursing note and vitals reviewed.           Assessment:       1. Neuroma    2. Plantar fasciitis        Plan:       Patient presents today with complaint of bilateral foot pain left greater than right patient states because of the corona virus pandemic he has had to change his exercise routine rather than running on a treadmill he has been doing more exercise outside running on hard ground he does have a history of plantar fasciitis currently wears power steps but is now complaining of pain in the forefoot bilateral left greater than right he states it feels like he has a hot poker in his foot.  On evaluation patient has obvious signs of inflammation under the plantar aspect of the 2nd webspace bilateral if this is much more noticeable in the left in comparison to the right.  There is mild splaying of the 2nd and 3rd digits consistent with positive Payne sign no Luan's click is noted on examination.  Patient does have findings consistent with neuroma 2nd webspace bilateral left greater than right I advised the patient obviously he is wearing the appropriate support he has been wearing power steps for a number of years because of a history of plantar fasciitis at this point I have advised the patient however he obviously needs more support and wear a metatarsal pad to offload pressure from the forefoot bilateral I added a small blue metatarsal pad bilateral want see how the patient responds to this I have advised the patient we can also add additional support to the power steps as needed at this point I am just recommended doing the and metatarsal pad to see how the patient tolerates this we can also discuss an anti-inflammatory if necessary patient advised if he is not seeing improvement over the next several weeks to contact me for follow-up further evaluation and treatment he is to continue to wear the power steps at all times.  Patient was made aware we may have to add a larger or thicker  metatarsal pad as tolerated.  Follow-up as needed.This note was created using [employer] voice recognition software that occasionally misinterpreted phrases or words.

## 2020-07-31 DIAGNOSIS — E29.1 TESTOSTERONE DEFICIENCY IN MALE: Primary | ICD-10-CM

## 2020-07-31 RX ORDER — TESTOSTERONE CYPIONATE 200 MG/ML
100 INJECTION, SOLUTION INTRAMUSCULAR
Qty: 6 ML | Refills: 3 | Status: SHIPPED | OUTPATIENT
Start: 2020-07-31 | End: 2020-10-14 | Stop reason: SDUPTHER

## 2020-10-14 ENCOUNTER — OFFICE VISIT (OUTPATIENT)
Dept: SURGERY | Facility: CLINIC | Age: 50
End: 2020-10-14
Payer: COMMERCIAL

## 2020-10-14 VITALS
TEMPERATURE: 98 F | OXYGEN SATURATION: 97 % | WEIGHT: 185 LBS | HEIGHT: 70 IN | BODY MASS INDEX: 26.48 KG/M2 | RESPIRATION RATE: 12 BRPM | DIASTOLIC BLOOD PRESSURE: 83 MMHG | HEART RATE: 62 BPM | SYSTOLIC BLOOD PRESSURE: 131 MMHG

## 2020-10-14 DIAGNOSIS — Z00.00 ENCOUNTER FOR WELLNESS EXAMINATION IN ADULT: Primary | ICD-10-CM

## 2020-10-14 DIAGNOSIS — Z82.49 FAMILY HISTORY OF CORONARY ARTERY DISEASE IN FATHER: ICD-10-CM

## 2020-10-14 DIAGNOSIS — E29.1 TESTOSTERONE DEFICIENCY IN MALE: ICD-10-CM

## 2020-10-14 DIAGNOSIS — E55.9 VITAMIN D DEFICIENCY: ICD-10-CM

## 2020-10-14 PROCEDURE — 3008F PR BODY MASS INDEX (BMI) DOCUMENTED: ICD-10-PCS | Mod: S$GLB,,, | Performed by: SURGERY

## 2020-10-14 PROCEDURE — 3008F BODY MASS INDEX DOCD: CPT | Mod: S$GLB,,, | Performed by: SURGERY

## 2020-10-14 PROCEDURE — 99213 OFFICE O/P EST LOW 20 MIN: CPT | Mod: S$GLB,,, | Performed by: SURGERY

## 2020-10-14 PROCEDURE — 99213 PR OFFICE/OUTPT VISIT, EST, LEVL III, 20-29 MIN: ICD-10-PCS | Mod: S$GLB,,, | Performed by: SURGERY

## 2020-10-14 RX ORDER — ANASTROZOLE 1 MG/1
TABLET ORAL
Qty: 12 TABLET | Refills: 3 | Status: SHIPPED | OUTPATIENT
Start: 2020-10-14

## 2020-10-14 RX ORDER — TESTOSTERONE CYPIONATE 200 MG/ML
100 INJECTION, SOLUTION INTRAMUSCULAR
Qty: 6 ML | Refills: 3 | Status: SHIPPED | OUTPATIENT
Start: 2020-10-14 | End: 2021-10-14

## 2020-10-14 RX ORDER — VALACYCLOVIR HYDROCHLORIDE 500 MG/1
1000 TABLET, FILM COATED ORAL 2 TIMES DAILY
Qty: 16 TABLET | Refills: 11 | Status: SHIPPED | OUTPATIENT
Start: 2020-10-14 | End: 2020-12-01

## 2020-10-14 NOTE — PROGRESS NOTES
Subjective:       Patient ID: Gustabo Iqbal     Chief Complaint:  Follow-up (Wellness)      HPI:  Mr. Iqbal presents today with no complaints.  He needs an annual physical exam.  He remains healthy and active.  He has no complaints whatsoever except mild fatigue that has improved but not completely resolved since institution of testosterone replacement therapy.  He continues to follow a healthy lifestyle.  Low fat low carbohydrate diet.  No smoking.  No alcohol consumption.  Strong family history of coronary artery disease, his father had a coronary artery bypass grafting in his 40s and  of a myocardial infarction in his 50s.  His father was a heavy tobacco abuser.  Screening colonoscopy 2 years ago was unremarkable.  He has known testosterone deficiency.  He has vitamin-D deficiency as well. Occasional outbreaks of HSV 1 labialis.  In the interval since his last visit he has switched pharmacies from [facility] to [facility].  He needs new prescriptions for all of his routine medicines rerouted to [facility].      Allergies & Meds:  Review of patient's allergies indicates:  No Known Allergies    Current Outpatient Medications   Medication Sig Dispense Refill    anastrozole (ARIMIDEX) 1 mg Tab Take 1/2 tab by mouth every Monday and Thursday. 12 tablet 3    testosterone cypionate (DEPOTESTOTERONE CYPIONATE) 200 mg/mL injection Inject 0.5 mLs (100 mg total) into the muscle every 7 days. 6 mL 3    vitamin D 1000 units Tab Take 1 tablet by mouth once daily.      valACYclovir (VALTREX) 500 MG tablet Take 2 tablets (1,000 mg total) by mouth 2 (two) times daily. 16 tablet 11     No current facility-administered medications for this visit.        PMFSHx:  Past Medical History:   Diagnosis Date    Annita-Parkinson-White syndrome        Past Surgical History:   Procedure Laterality Date    cardiac ablasion      CARDIAC ELECTROPHYSIOLOGY STUDY AND ABLATION      COLONOSCOPY N/A 12/3/2018    Procedure: COLONOSCOPY;   Surgeon: Jimenez Rogers MD;  Location: [facility];  Service: General;  Laterality: N/A;       Family History   Problem Relation Age of Onset    Heart disease Father        Social History     Tobacco Use    Smoking status: Never Smoker    Smokeless tobacco: Never Used   Substance Use Topics    Alcohol use: No     Frequency: Never    Drug use: No       Review of Systems   Constitutional: Negative for appetite change, chills, fatigue, fever and unexpected weight change.   HENT: Negative for congestion, dental problem, ear pain, mouth sores, postnasal drip, rhinorrhea, sore throat, tinnitus, trouble swallowing and voice change.    Eyes: Negative for photophobia, pain, discharge and visual disturbance.   Respiratory: Negative for cough, chest tightness, shortness of breath and wheezing.    Cardiovascular: Negative for chest pain, palpitations and leg swelling.   Gastrointestinal: Negative for abdominal pain, blood in stool, constipation, diarrhea, nausea and vomiting.   Endocrine: Negative for cold intolerance, heat intolerance, polydipsia, polyphagia and polyuria.   Genitourinary: Negative for difficulty urinating, dysuria, flank pain, frequency, hematuria and urgency.   Musculoskeletal: Negative for arthralgias, joint swelling and myalgias.   Skin: Negative for color change and rash.   Allergic/Immunologic: Negative for immunocompromised state.   Neurological: Negative for dizziness, tremors, seizures, syncope, speech difficulty, weakness, numbness and headaches.   Hematological: Negative for adenopathy. Does not bruise/bleed easily.   Psychiatric/Behavioral: Negative for agitation, confusion, hallucinations, self-injury and suicidal ideas. The patient is not nervous/anxious.             Physical Exam  Constitutional:       General: He is not in acute distress.     Appearance: Normal appearance. He is well-developed. He is not ill-appearing or toxic-appearing.      Comments: Body mass index is 26.54 kg/m².      HENT:      Head: Normocephalic and atraumatic. No contusion.      Right Ear: Hearing and external ear normal. No drainage or tenderness.      Left Ear: Hearing and external ear normal. No drainage or tenderness.      Nose: Nose normal. No rhinorrhea.   Eyes:      General: Lids are normal.         Right eye: No discharge.         Left eye: No discharge.      Conjunctiva/sclera: Conjunctivae normal.      Right eye: Right conjunctiva is not injected. No exudate.     Left eye: Left conjunctiva is not injected. No exudate.     Pupils: Pupils are equal, round, and reactive to light.   Neck:      Musculoskeletal: Normal range of motion. No edema.      Thyroid: No thyroid mass or thyromegaly.      Vascular: No carotid bruit or JVD.      Trachea: Trachea and phonation normal. No tracheal deviation.   Cardiovascular:      Rate and Rhythm: Normal rate and regular rhythm.      Chest Wall: PMI is not displaced.      Heart sounds: Normal heart sounds, S1 normal and S2 normal. No murmur. No friction rub. No gallop.    Pulmonary:      Effort: Pulmonary effort is normal. No tachypnea, accessory muscle usage, respiratory distress or retractions.      Breath sounds: Normal breath sounds.   Chest:      Chest wall: No mass, tenderness or crepitus.      Breasts: Breasts are symmetrical.         Right: No inverted nipple, mass, nipple discharge or skin change.         Left: No inverted nipple, mass, nipple discharge or skin change.   Abdominal:      General: Bowel sounds are normal. There is no distension or abdominal bruit.      Palpations: Abdomen is soft. There is no fluid wave or mass.      Tenderness: There is no abdominal tenderness.      Hernia: No hernia is present. There is no hernia in the ventral area or left inguinal area.   Genitourinary:     Penis: Normal.       Scrotum/Testes: Normal.         Right: Mass or swelling not present.         Left: Mass or swelling not present.   Musculoskeletal:      Cervical back: Normal.       Thoracic back: Normal.      Lumbar back: Normal.      Right upper arm: Normal.      Left upper arm: Normal.      Right forearm: Normal.      Left forearm: Normal.      Right hand: Normal.      Left hand: Normal.      Right upper leg: Normal.      Left upper leg: Normal.      Right lower leg: Normal.      Left lower leg: Normal.      Right foot: Normal.      Left foot: Normal.   Lymphadenopathy:      Head:      Right side of head: No submental or submandibular adenopathy.      Left side of head: No submental or submandibular adenopathy.      Cervical: No cervical adenopathy.      Upper Body:      Right upper body: No supraclavicular adenopathy.      Left upper body: No supraclavicular adenopathy.      Lower Body: No right inguinal adenopathy. No left inguinal adenopathy.   Skin:     General: Skin is warm and dry.      Findings: No lesion or rash.      Nails: There is no clubbing.     Neurological:      Mental Status: He is alert and oriented to person, place, and time. He is not disoriented.      GCS: GCS eye subscore is 4. GCS verbal subscore is 5. GCS motor subscore is 6.      Cranial Nerves: No cranial nerve deficit.      Sensory: No sensory deficit.      Motor: No tremor or atrophy.      Coordination: Coordination normal.      Gait: Gait normal.   Psychiatric:         Attention and Perception: He is attentive.         Speech: Speech normal.         Behavior: Behavior normal.         Thought Content: Thought content normal.             Medical Records Review:  Pending    Assessment:         1. Encounter for wellness examination in adult    2. Family history of coronary artery disease in father    3. Vitamin D deficiency    4. Testosterone deficiency in male          Plan:     Encounter for wellness examination in adult  -     CBC auto differential; Future; Expected date: 10/14/2020  -     Comprehensive Metabolic Panel; Future; Expected date: 10/14/2020  -     TSH; Future; Expected date: 10/14/2020  -      Hemoglobin A1C; Future; Expected date: 10/14/2020  -     PSA, Screening; Future; Expected date: 10/14/2020  -     Influenza - Quadrivalent (MDCK)    Family history of coronary artery disease in father  -     Lipid panel; Future; Expected date: 10/14/2020  -     SCHEDULED EKG 12-LEAD (to Muse); Future    Vitamin D deficiency  -     Vitamin D; Future; Expected date: 10/14/2020    Testosterone deficiency in male  -     Testosterone Panel; Future; Expected date: 10/14/2020    Other orders  -     anastrozole (ARIMIDEX) 1 mg Tab; Take 1/2 tab by mouth every Monday and Thursday.  Dispense: 12 tablet; Refill: 3  -     testosterone cypionate (DEPOTESTOTERONE CYPIONATE) 200 mg/mL injection; Inject 0.5 mLs (100 mg total) into the muscle every 7 days.  Dispense: 6 mL; Refill: 3  -     valACYclovir (VALTREX) 500 MG tablet; Take 2 tablets (1,000 mg total) by mouth 2 (two) times daily.  Dispense: 16 tablet; Refill: 11        Follow up in about 1 month (around 11/14/2020) for results.

## 2020-10-15 ENCOUNTER — CLINICAL SUPPORT (OUTPATIENT)
Dept: INTERNAL MEDICINE | Facility: CLINIC | Age: 50
End: 2020-10-15

## 2020-10-15 PROCEDURE — 92551 PURE TONE HEARING TEST AIR: CPT | Mod: ,,, | Performed by: NURSE PRACTITIONER

## 2020-10-15 PROCEDURE — 92551 PR PURE TONE HEARING TEST, AIR: ICD-10-PCS | Mod: ,,, | Performed by: NURSE PRACTITIONER

## 2020-10-19 ENCOUNTER — CLINICAL SUPPORT (OUTPATIENT)
Dept: FAMILY MEDICINE | Facility: CLINIC | Age: 50
End: 2020-10-19
Payer: COMMERCIAL

## 2020-10-19 ENCOUNTER — TELEPHONE (OUTPATIENT)
Dept: FAMILY MEDICINE | Facility: CLINIC | Age: 50
End: 2020-10-19

## 2020-10-19 ENCOUNTER — HOSPITAL ENCOUNTER (OUTPATIENT)
Dept: CARDIOLOGY | Facility: HOSPITAL | Age: 50
Discharge: HOME | End: 2020-10-19
Attending: SURGERY
Payer: COMMERCIAL

## 2020-10-19 VITALS — TEMPERATURE: 98 F

## 2020-10-19 DIAGNOSIS — Z82.49 FAMILY HISTORY OF CORONARY ARTERY DISEASE IN FATHER: ICD-10-CM

## 2020-10-19 DIAGNOSIS — Z23 NEED FOR VACCINATION FOR H FLU TYPE B: Primary | ICD-10-CM

## 2020-10-19 DIAGNOSIS — Z23 FLU VACCINE NEED: Primary | ICD-10-CM

## 2020-10-19 PROCEDURE — 90471 IMMUNIZATION ADMIN: CPT | Mod: S$GLB,,, | Performed by: NURSE PRACTITIONER

## 2020-10-19 PROCEDURE — 90471 FLU VACCINE (QUAD) GREATER THAN OR EQUAL TO 3YO PRESERVATIVE FREE IM: ICD-10-PCS | Mod: S$GLB,,, | Performed by: NURSE PRACTITIONER

## 2020-10-19 PROCEDURE — 90686 IIV4 VACC NO PRSV 0.5 ML IM: CPT | Mod: S$GLB,,, | Performed by: NURSE PRACTITIONER

## 2020-10-19 PROCEDURE — 93005 ELECTROCARDIOGRAM TRACING: CPT

## 2020-10-19 PROCEDURE — 93010 EKG 12-LEAD: ICD-10-PCS | Mod: ,,, | Performed by: INTERNAL MEDICINE

## 2020-10-19 PROCEDURE — 90686 FLU VACCINE (QUAD) GREATER THAN OR EQUAL TO 3YO PRESERVATIVE FREE IM: ICD-10-PCS | Mod: S$GLB,,, | Performed by: NURSE PRACTITIONER

## 2020-10-19 PROCEDURE — 93010 ELECTROCARDIOGRAM REPORT: CPT | Mod: ,,, | Performed by: INTERNAL MEDICINE

## 2020-10-19 NOTE — PROGRESS NOTES
Flu Vaccine administered Right Deltoid. Lot [id number] exp 6/30/21. Patient tolerated well. Instructed to wait for observation.

## 2020-11-30 PROBLEM — K29.70 VIRAL GASTRITIS: Status: ACTIVE | Noted: 2020-11-30

## 2020-12-29 PROBLEM — Z20.822 CLOSE EXPOSURE TO COVID-19 VIRUS: Status: ACTIVE | Noted: 2020-12-29

## 2021-02-02 ENCOUNTER — IMMUNIZATION (OUTPATIENT)
Dept: FAMILY MEDICINE | Facility: CLINIC | Age: 51
End: 2021-02-02
Payer: COMMERCIAL

## 2021-02-02 DIAGNOSIS — Z23 NEED FOR VACCINATION: Primary | ICD-10-CM

## 2021-02-02 PROCEDURE — 0011A COVID-19, MRNA, LNP-S, PF, 100 MCG/0.5 ML DOSE VACCINE: CPT | Mod: CV19,S$GLB,, | Performed by: FAMILY MEDICINE

## 2021-02-02 PROCEDURE — 0011A COVID-19, MRNA, LNP-S, PF, 100 MCG/0.5 ML DOSE VACCINE: ICD-10-PCS | Mod: CV19,S$GLB,, | Performed by: FAMILY MEDICINE

## 2021-02-02 PROCEDURE — 91301 COVID-19, MRNA, LNP-S, PF, 100 MCG/0.5 ML DOSE VACCINE: ICD-10-PCS | Mod: S$GLB,,, | Performed by: FAMILY MEDICINE

## 2021-02-02 PROCEDURE — 91301 COVID-19, MRNA, LNP-S, PF, 100 MCG/0.5 ML DOSE VACCINE: CPT | Mod: S$GLB,,, | Performed by: FAMILY MEDICINE

## 2021-03-03 ENCOUNTER — IMMUNIZATION (OUTPATIENT)
Dept: FAMILY MEDICINE | Facility: CLINIC | Age: 51
End: 2021-03-03
Payer: COMMERCIAL

## 2021-03-03 DIAGNOSIS — Z23 NEED FOR VACCINATION: Primary | ICD-10-CM

## 2021-03-03 PROCEDURE — 0012A COVID-19, MRNA, LNP-S, PF, 100 MCG/0.5 ML DOSE VACCINE: CPT | Mod: PBBFAC | Performed by: FAMILY MEDICINE

## 2021-04-07 ENCOUNTER — OFFICE VISIT (OUTPATIENT)
Dept: SURGERY | Facility: CLINIC | Age: 51
End: 2021-04-07
Payer: COMMERCIAL

## 2021-04-07 VITALS
TEMPERATURE: 98 F | BODY MASS INDEX: 26.92 KG/M2 | DIASTOLIC BLOOD PRESSURE: 92 MMHG | HEIGHT: 70 IN | WEIGHT: 188 LBS | SYSTOLIC BLOOD PRESSURE: 143 MMHG | HEART RATE: 66 BPM | OXYGEN SATURATION: 97 %

## 2021-04-07 DIAGNOSIS — R10.84 GENERALIZED ABDOMINAL PAIN: Primary | ICD-10-CM

## 2021-04-07 DIAGNOSIS — Z01.818 PRE-OP TESTING: ICD-10-CM

## 2021-04-07 PROCEDURE — 1126F AMNT PAIN NOTED NONE PRSNT: CPT | Mod: S$GLB,,, | Performed by: SURGERY

## 2021-04-07 PROCEDURE — 99213 PR OFFICE/OUTPT VISIT, EST, LEVL III, 20-29 MIN: ICD-10-PCS | Mod: S$GLB,,, | Performed by: SURGERY

## 2021-04-07 PROCEDURE — 1126F PR PAIN SEVERITY QUANTIFIED, NO PAIN PRESENT: ICD-10-PCS | Mod: S$GLB,,, | Performed by: SURGERY

## 2021-04-07 PROCEDURE — 3008F BODY MASS INDEX DOCD: CPT | Mod: S$GLB,,, | Performed by: SURGERY

## 2021-04-07 PROCEDURE — 3008F PR BODY MASS INDEX (BMI) DOCUMENTED: ICD-10-PCS | Mod: S$GLB,,, | Performed by: SURGERY

## 2021-04-07 PROCEDURE — 99213 OFFICE O/P EST LOW 20 MIN: CPT | Mod: S$GLB,,, | Performed by: SURGERY

## 2021-04-15 ENCOUNTER — HOSPITAL ENCOUNTER (OUTPATIENT)
Dept: RADIOLOGY | Facility: HOSPITAL | Age: 51
Discharge: HOME | End: 2021-04-15
Attending: SURGERY
Payer: COMMERCIAL

## 2021-04-15 DIAGNOSIS — R10.84 GENERALIZED ABDOMINAL PAIN: ICD-10-CM

## 2021-04-15 PROCEDURE — 74177 CT ABDOMEN PELVIS WITH CONTRAST: ICD-10-PCS | Mod: 26,,, | Performed by: RADIOLOGY

## 2021-04-15 PROCEDURE — 25500020 PHARM REV CODE 255: Performed by: SURGERY

## 2021-04-15 PROCEDURE — 74177 CT ABD & PELVIS W/CONTRAST: CPT | Mod: TC

## 2021-04-15 PROCEDURE — A9698 NON-RAD CONTRAST MATERIALNOC: HCPCS | Performed by: SURGERY

## 2021-04-15 PROCEDURE — 74177 CT ABD & PELVIS W/CONTRAST: CPT | Mod: 26,,, | Performed by: RADIOLOGY

## 2021-04-15 RX ADMIN — IOHEXOL 1000 ML: 9 SOLUTION ORAL at 07:04

## 2021-04-15 RX ADMIN — IOHEXOL 75 ML: 350 INJECTION, SOLUTION INTRAVENOUS at 09:04

## 2021-04-28 ENCOUNTER — OFFICE VISIT (OUTPATIENT)
Dept: SURGERY | Facility: CLINIC | Age: 51
End: 2021-04-28
Payer: COMMERCIAL

## 2021-04-28 VITALS
TEMPERATURE: 98 F | HEIGHT: 70 IN | OXYGEN SATURATION: 99 % | DIASTOLIC BLOOD PRESSURE: 92 MMHG | RESPIRATION RATE: 19 BRPM | WEIGHT: 192 LBS | SYSTOLIC BLOOD PRESSURE: 140 MMHG | HEART RATE: 73 BPM | BODY MASS INDEX: 27.49 KG/M2

## 2021-04-28 DIAGNOSIS — S39.011D STRAIN OF ABDOMINAL MUSCLE, SUBSEQUENT ENCOUNTER: Primary | ICD-10-CM

## 2021-04-28 PROCEDURE — 1126F PR PAIN SEVERITY QUANTIFIED, NO PAIN PRESENT: ICD-10-PCS | Mod: S$GLB,,, | Performed by: SURGERY

## 2021-04-28 PROCEDURE — 3008F BODY MASS INDEX DOCD: CPT | Mod: S$GLB,,, | Performed by: SURGERY

## 2021-04-28 PROCEDURE — 3008F PR BODY MASS INDEX (BMI) DOCUMENTED: ICD-10-PCS | Mod: S$GLB,,, | Performed by: SURGERY

## 2021-04-28 PROCEDURE — 99213 OFFICE O/P EST LOW 20 MIN: CPT | Mod: S$GLB,,, | Performed by: SURGERY

## 2021-04-28 PROCEDURE — 1126F AMNT PAIN NOTED NONE PRSNT: CPT | Mod: S$GLB,,, | Performed by: SURGERY

## 2021-04-28 PROCEDURE — 99213 PR OFFICE/OUTPT VISIT, EST, LEVL III, 20-29 MIN: ICD-10-PCS | Mod: S$GLB,,, | Performed by: SURGERY

## 2021-07-22 ENCOUNTER — PATIENT MESSAGE (OUTPATIENT)
Dept: SURGERY | Facility: CLINIC | Age: 51
End: 2021-07-22

## 2021-12-21 ENCOUNTER — IMMUNIZATION (OUTPATIENT)
Dept: FAMILY MEDICINE | Facility: CLINIC | Age: 51
End: 2021-12-21
Payer: COMMERCIAL

## 2021-12-21 DIAGNOSIS — Z23 NEED FOR VACCINATION: Primary | ICD-10-CM

## 2021-12-21 PROCEDURE — 91301 COVID-19, MRNA, LNP-S, PF, 100 MCG/0.5 ML DOSE VACCINE: CPT | Mod: PBBFAC | Performed by: FAMILY MEDICINE

## 2021-12-21 PROCEDURE — 0013A COVID-19, MRNA, LNP-S, PF, 100 MCG/0.5 ML DOSE VACCINE: CPT | Mod: PBBFAC | Performed by: FAMILY MEDICINE

## 2022-08-08 ENCOUNTER — OFFICE VISIT (OUTPATIENT)
Dept: INTERNAL MEDICINE | Facility: CLINIC | Age: 52
End: 2022-08-08
Payer: COMMERCIAL

## 2022-08-08 VITALS
SYSTOLIC BLOOD PRESSURE: 128 MMHG | WEIGHT: 190 LBS | HEIGHT: 70 IN | OXYGEN SATURATION: 97 % | TEMPERATURE: 97 F | BODY MASS INDEX: 27.2 KG/M2 | DIASTOLIC BLOOD PRESSURE: 85 MMHG | HEART RATE: 65 BPM

## 2022-08-08 DIAGNOSIS — Z00.00 WELLNESS EXAMINATION: Primary | ICD-10-CM

## 2022-08-08 LAB
CHOLEST SERPL-MCNC: 184 MG/DL (ref 120–199)
CHOLEST/HDLC SERPL: 3.1 {RATIO} (ref 2–5)
GLUCOSE SERPL-MCNC: 99 MG/DL (ref 70–110)
HDLC SERPL-MCNC: 60 MG/DL (ref 40–75)
HDLC SERPL: 32.6 % (ref 20–50)
LDLC SERPL CALC-MCNC: 116.4 MG/DL (ref 63–159)
NONHDLC SERPL-MCNC: 124 MG/DL
TRIGL SERPL-MCNC: 38 MG/DL (ref 30–150)

## 2022-08-08 PROCEDURE — 3008F PR BODY MASS INDEX (BMI) DOCUMENTED: ICD-10-PCS | Mod: S$GLB,,, | Performed by: PHYSICIAN ASSISTANT

## 2022-08-08 PROCEDURE — 3074F SYST BP LT 130 MM HG: CPT | Mod: S$GLB,,, | Performed by: PHYSICIAN ASSISTANT

## 2022-08-08 PROCEDURE — 3079F PR MOST RECENT DIASTOLIC BLOOD PRESSURE 80-89 MM HG: ICD-10-PCS | Mod: S$GLB,,, | Performed by: PHYSICIAN ASSISTANT

## 2022-08-08 PROCEDURE — 1159F PR MEDICATION LIST DOCUMENTED IN MEDICAL RECORD: ICD-10-PCS | Mod: S$GLB,,, | Performed by: PHYSICIAN ASSISTANT

## 2022-08-08 PROCEDURE — 1160F RVW MEDS BY RX/DR IN RCRD: CPT | Mod: S$GLB,,, | Performed by: PHYSICIAN ASSISTANT

## 2022-08-08 PROCEDURE — 82947 ASSAY GLUCOSE BLOOD QUANT: CPT | Performed by: PHYSICIAN ASSISTANT

## 2022-08-08 PROCEDURE — 3008F BODY MASS INDEX DOCD: CPT | Mod: S$GLB,,, | Performed by: PHYSICIAN ASSISTANT

## 2022-08-08 PROCEDURE — 3079F DIAST BP 80-89 MM HG: CPT | Mod: S$GLB,,, | Performed by: PHYSICIAN ASSISTANT

## 2022-08-08 PROCEDURE — 3074F PR MOST RECENT SYSTOLIC BLOOD PRESSURE < 130 MM HG: ICD-10-PCS | Mod: S$GLB,,, | Performed by: PHYSICIAN ASSISTANT

## 2022-08-08 PROCEDURE — 1160F PR REVIEW ALL MEDS BY PRESCRIBER/CLIN PHARMACIST DOCUMENTED: ICD-10-PCS | Mod: S$GLB,,, | Performed by: PHYSICIAN ASSISTANT

## 2022-08-08 PROCEDURE — 80061 LIPID PANEL: CPT | Performed by: PHYSICIAN ASSISTANT

## 2022-08-08 PROCEDURE — 99386 PREV VISIT NEW AGE 40-64: CPT | Mod: S$GLB,,, | Performed by: PHYSICIAN ASSISTANT

## 2022-08-08 PROCEDURE — 1159F MED LIST DOCD IN RCRD: CPT | Mod: S$GLB,,, | Performed by: PHYSICIAN ASSISTANT

## 2022-08-08 PROCEDURE — 99386 PR PREVENTIVE VISIT,NEW,40-64: ICD-10-PCS | Mod: S$GLB,,, | Performed by: PHYSICIAN ASSISTANT

## 2022-08-08 NOTE — PROGRESS NOTES
had concerns including Annual Exam.      50 y/o M here today for wellness examination for insurance purposes.  No complaints or issues.         Review of patient's allergies indicates:  No Known Allergies    Current Outpatient Medications on File Prior to Visit   Medication Sig Dispense Refill    anastrozole (ARIMIDEX) 1 mg Tab Take 1/2 tab by mouth every Monday and Thursday. 12 tablet 3    testosterone cypionate (DEPOTESTOTERONE CYPIONATE) 200 mg/mL injection Inject 0.5 mLs (100 mg total) into the muscle every 7 days. 6 mL 3    valACYclovir (VALTREX) 500 MG tablet Take 2 tablets (1,000 mg total) by mouth 2 (two) times daily. 16 tablet 11    vitamin D 1000 units Tab Take 1 tablet by mouth once daily.       No current facility-administered medications on file prior to visit.       Past Medical History:   Diagnosis Date    Annita-Parkinson-White syndrome       Past Surgical History:   Procedure Laterality Date    cardiac ablasion      CARDIAC ELECTROPHYSIOLOGY STUDY AND ABLATION      COLONOSCOPY N/A 12/3/2018    Procedure: COLONOSCOPY;  Surgeon: Jimenez Rogers MD;  Location: [facility];  Service: General;  Laterality: N/A;       Social History     Tobacco Use    Smoking status: Never Smoker    Smokeless tobacco: Never Used   Substance Use Topics    Alcohol use: No    Drug use: No        Family History   Problem Relation Age of Onset    Heart disease Father           Constitution: Negative. Negative for chills, sweating, fatigue and fever.   HENT: Negative for congestion, postnasal drip, sinus pain, sinus pressure, sore throat and trouble swallowing.    Neck: neck negative.   Cardiovascular: Negative.  Negative for chest pain, palpitations and sob on exertion.   Eyes: Negative.    Respiratory: Negative.  Negative for chest tightness, cough, shortness of breath, wheezing and asthma.    Gastrointestinal: Negative.    Endocrine: negative.   Genitourinary: Negative.    Skin: Negative for erythema and hives.  "  Allergic/Immunologic: Negative.  Negative for environmental allergies, asthma, hives, itching and sneezing.   Neurological: Negative.  Negative for dizziness, light-headedness, disorientation, altered mental status, numbness and tingling.   Hematologic/Lymphatic: Negative.    Psychiatric/Behavioral: Negative.  Negative for altered mental status, disorientation, confusion, agitation and nervous/anxious. The patient is not nervous/anxious.        /85 (BP Location: Left arm, Patient Position: Sitting, BP Method: Medium (Automatic))   Pulse 65   Temp 97.4 °F (36.3 °C) (Oral)   Ht 5' 10" (1.778 m)   Wt 86.2 kg (190 lb)   SpO2 97%   BMI 27.26 kg/m²     Physical Exam   Constitutional: He is oriented to person, place, and time. He appears well-developed. He is cooperative.  Non-toxic appearance. He does not appear ill. No distress. normalawake  HENT:   Head: Normocephalic and atraumatic.   Ears:   Right Ear: Hearing, tympanic membrane, external ear and ear canal normal. impacted cerumen  Left Ear: Hearing, tympanic membrane, external ear and ear canal normal. impacted cerumen  Nose: Nose normal. No mucosal edema, rhinorrhea, nasal deformity or congestion. No epistaxis. Right sinus exhibits no maxillary sinus tenderness and no frontal sinus tenderness. Left sinus exhibits no maxillary sinus tenderness and no frontal sinus tenderness.   Mouth/Throat: Uvula is midline, oropharynx is clear and moist and mucous membranes are normal. Mucous membranes are moist. No trismus in the jaw. Normal dentition. No uvula swelling. No oropharyngeal exudate or posterior oropharyngeal erythema. Tonsils are 0 on the right. Tonsils are 0 on the left. No tonsillar exudate.   Eyes: Conjunctivae and lids are normal. Pupils are equal, round, and reactive to light. Right eye exhibits no discharge. Left eye exhibits no discharge. No scleral icterus. Extraocular movement intact   Neck: Trachea normal and phonation normal. Neck supple. " Normal carotid pulses and no JVD present. Carotid bruit is not present.   Cardiovascular: Normal rate, regular rhythm, normal heart sounds and normal pulses.   No murmur heard.  Pulmonary/Chest: Effort normal and breath sounds normal. No stridor. No respiratory distress. He has no decreased breath sounds. He has no wheezes. He has no rhonchi. He has no rales.   Abdominal: Normal appearance and bowel sounds are normal. He exhibits no distension and no mass. Soft. There is no abdominal tenderness. There is no rebound, no guarding, no left CVA tenderness and no right CVA tenderness.   Musculoskeletal: Normal range of motion.         General: No swelling, tenderness, deformity or signs of injury. Normal range of motion.      Right lower leg: No edema.      Left lower leg: No edema.   Lymphadenopathy:        Head (right side): No submental, no submandibular, no tonsillar, no preauricular, no posterior auricular and no occipital adenopathy present.        Head (left side): No submental, no submandibular, no tonsillar, no preauricular, no posterior auricular and no occipital adenopathy present.   Neurological: no focal deficit. He is alert and oriented to person, place, and time. He has normal motor skills, normal sensation and normal reflexes. He displays no weakness and normal reflexes. No cranial nerve deficit or sensory deficit. He exhibits normal muscle tone. Gait and coordination normal. Coordination and gait normal.   Reflex Scores:       Tricep reflexes are 2+ on the right side and 2+ on the left side.       Bicep reflexes are 2+ on the right side and 2+ on the left side.       Patellar reflexes are 2+ on the right side and 2+ on the left side.       Achilles reflexes are 2+ on the right side and 2+ on the left side.  Skin: Skin is warm, dry, intact, not diaphoretic and not pale. Capillary refill takes less than 2 seconds. No erythema   Psychiatric: His speech is normal and behavior is normal. Mood, judgment and  thought content normal.   Nursing note and vitals reviewed.              I have checked the patient's  prior to prescribing opioids and/or other controlled substances.        Assessment and Plan (Medical Justification)      Gustabo was seen today for annual exam.    Diagnoses and all orders for this visit:    Wellness examination  -     Lipid panel; Future  -     Glucose, fasting; Future  -     Glucose, fasting  -     Lipid panel       Normal physical examination.   F/U with PCP       Available Notes, Procedures and Results, including Labs/Imaging, from the last 3 months were reviewed prior to this visit.    Risks, benefits, and alternatives to the plan were reviewed in detail and all questions were answered to the fullest satisfaction of the patient, and pt verbalized understanding and agreement to treatment plan.   Strict return precautions reviewed and patient verbalized understanding.   Patient instructed to call with any new or worsening symptoms, or present to the ER.           Arnav Jiang PA-C

## 2022-08-09 ENCOUNTER — TELEPHONE (OUTPATIENT)
Dept: INTERNAL MEDICINE | Facility: CLINIC | Age: 52
End: 2022-08-09
Payer: COMMERCIAL

## 2022-08-09 NOTE — TELEPHONE ENCOUNTER
Called patient to discuss wellness exam labs.  Discussed glucose and Chol panel.  No concerns or issues.

## 2022-10-17 ENCOUNTER — OCCUPATIONAL HEALTH (OUTPATIENT)
Dept: URGENT CARE | Facility: CLINIC | Age: 52
End: 2022-10-17

## 2022-10-17 DIAGNOSIS — Z13.9 ENCOUNTER FOR SCREENING: Primary | ICD-10-CM

## (undated) DEVICE — SYR SLIP TIP 5CC

## (undated) DEVICE — KIT ENDO CARRY ON PROCEDURE

## (undated) DEVICE — SOL IRRI STRL WATER 1000ML

## (undated) DEVICE — CANISTER SUCTION 3000CC

## (undated) DEVICE — GLOVE PROTEXIS PI SYN SURG 6.5